# Patient Record
Sex: MALE | Race: WHITE | HISPANIC OR LATINO | Employment: UNEMPLOYED | URBAN - METROPOLITAN AREA
[De-identification: names, ages, dates, MRNs, and addresses within clinical notes are randomized per-mention and may not be internally consistent; named-entity substitution may affect disease eponyms.]

---

## 2018-03-27 ENCOUNTER — OFFICE VISIT - HEALTHEAST (OUTPATIENT)
Dept: FAMILY MEDICINE | Facility: CLINIC | Age: 18
End: 2018-03-27

## 2018-03-27 ENCOUNTER — COMMUNICATION - HEALTHEAST (OUTPATIENT)
Dept: FAMILY MEDICINE | Facility: CLINIC | Age: 18
End: 2018-03-27

## 2018-03-27 DIAGNOSIS — D49.2 ATYPICAL SQUAMOPROLIFERATIVE SKIN LESION: ICD-10-CM

## 2018-03-27 DIAGNOSIS — R53.83 FATIGUE: ICD-10-CM

## 2018-03-27 DIAGNOSIS — B27.90 INFECTIOUS MONONUCLEOSIS: ICD-10-CM

## 2018-03-27 DIAGNOSIS — L70.0 ACNE VULGARIS: ICD-10-CM

## 2018-03-27 LAB
BASOPHILS # BLD AUTO: 0 THOU/UL (ref 0–0.2)
BASOPHILS NFR BLD AUTO: 1 % (ref 0–2)
EOSINOPHIL # BLD AUTO: 0.4 THOU/UL (ref 0–0.4)
EOSINOPHIL NFR BLD AUTO: 4 % (ref 0–6)
ERYTHROCYTE [DISTWIDTH] IN BLOOD BY AUTOMATED COUNT: 12.2 % (ref 11–14.5)
HCT VFR BLD AUTO: 52.2 % (ref 40–54)
HGB BLD-MCNC: 17.7 G/DL (ref 14–18)
LYMPHOCYTES # BLD AUTO: 2.1 THOU/UL (ref 0.8–4.4)
LYMPHOCYTES NFR BLD AUTO: 25 % (ref 20–40)
MCH RBC QN AUTO: 31.1 PG (ref 27–34)
MCHC RBC AUTO-ENTMCNC: 33.9 G/DL (ref 32–36)
MCV RBC AUTO: 92 FL (ref 80–100)
MONOCYTES # BLD AUTO: 0.5 THOU/UL (ref 0–0.9)
MONOCYTES NFR BLD AUTO: 7 % (ref 2–10)
NEUTROPHILS # BLD AUTO: 5.2 THOU/UL (ref 2–7.7)
NEUTROPHILS NFR BLD AUTO: 63 % (ref 50–70)
PLATELET # BLD AUTO: 188 THOU/UL (ref 140–440)
PMV BLD AUTO: 8.6 FL (ref 7–10)
RBC # BLD AUTO: 5.7 MILL/UL (ref 4.4–6.2)
WBC: 8.2 THOU/UL (ref 4–11)

## 2018-04-19 ENCOUNTER — AMBULATORY - HEALTHEAST (OUTPATIENT)
Dept: FAMILY MEDICINE | Facility: CLINIC | Age: 18
End: 2018-04-19

## 2018-04-19 DIAGNOSIS — Q82.5 PORT WINE STAIN: ICD-10-CM

## 2018-04-19 DIAGNOSIS — Q82.5 CONGENITAL NEVUS: ICD-10-CM

## 2018-04-19 DIAGNOSIS — D49.2 ATYPICAL SQUAMOPROLIFERATIVE SKIN LESION: ICD-10-CM

## 2018-04-24 LAB
LAB AP CHARGES (HE HISTORICAL CONVERSION): NORMAL
PATH REPORT.COMMENTS IMP SPEC: NORMAL
PATH REPORT.FINAL DX SPEC: NORMAL
PATH REPORT.GROSS SPEC: NORMAL
PATH REPORT.MICROSCOPIC SPEC OTHER STN: NORMAL
PATH REPORT.RELEVANT HX SPEC: NORMAL
RESULT FLAG (HE HISTORICAL CONVERSION): NORMAL

## 2018-06-27 ENCOUNTER — OFFICE VISIT - HEALTHEAST (OUTPATIENT)
Dept: FAMILY MEDICINE | Facility: CLINIC | Age: 18
End: 2018-06-27

## 2018-06-27 DIAGNOSIS — S61.012A THUMB LACERATION, LEFT, INITIAL ENCOUNTER: ICD-10-CM

## 2018-08-06 ENCOUNTER — RECORDS - HEALTHEAST (OUTPATIENT)
Dept: ADMINISTRATIVE | Facility: OTHER | Age: 18
End: 2018-08-06

## 2018-08-30 ENCOUNTER — OFFICE VISIT - HEALTHEAST (OUTPATIENT)
Dept: FAMILY MEDICINE | Facility: CLINIC | Age: 18
End: 2018-08-30

## 2018-08-30 DIAGNOSIS — R06.02 SOB (SHORTNESS OF BREATH): ICD-10-CM

## 2018-08-30 DIAGNOSIS — F12.20 TETRAHYDROCANNABINOL (THC) USE DISORDER, MODERATE, DEPENDENCE (H): ICD-10-CM

## 2018-08-30 LAB
ALBUMIN SERPL-MCNC: 4.3 G/DL (ref 3.5–5)
ALP SERPL-CCNC: 72 U/L (ref 45–120)
ALT SERPL W P-5'-P-CCNC: 12 U/L (ref 0–45)
ANION GAP SERPL CALCULATED.3IONS-SCNC: 10 MMOL/L (ref 5–18)
AST SERPL W P-5'-P-CCNC: 11 U/L (ref 0–40)
BILIRUB SERPL-MCNC: 0.8 MG/DL (ref 0–1)
BUN SERPL-MCNC: 11 MG/DL (ref 8–22)
CALCIUM SERPL-MCNC: 10.1 MG/DL (ref 8.5–10.5)
CHLORIDE BLD-SCNC: 106 MMOL/L (ref 98–107)
CO2 SERPL-SCNC: 26 MMOL/L (ref 22–31)
CREAT SERPL-MCNC: 0.92 MG/DL (ref 0.7–1.3)
ERYTHROCYTE [DISTWIDTH] IN BLOOD BY AUTOMATED COUNT: 12.2 % (ref 11–14.5)
GFR SERPL CREATININE-BSD FRML MDRD: >60 ML/MIN/1.73M2
GLUCOSE BLD-MCNC: 84 MG/DL (ref 70–125)
HCT VFR BLD AUTO: 50.6 % (ref 40–54)
HGB BLD-MCNC: 17.4 G/DL (ref 14–18)
MCH RBC QN AUTO: 30.5 PG (ref 27–34)
MCHC RBC AUTO-ENTMCNC: 34.3 G/DL (ref 32–36)
MCV RBC AUTO: 89 FL (ref 80–100)
PLATELET # BLD AUTO: 207 THOU/UL (ref 140–440)
PMV BLD AUTO: 9.2 FL (ref 7–10)
POTASSIUM BLD-SCNC: 4.4 MMOL/L (ref 3.5–5)
PROT SERPL-MCNC: 7.2 G/DL (ref 6–8)
RBC # BLD AUTO: 5.69 MILL/UL (ref 4.4–6.2)
SODIUM SERPL-SCNC: 142 MMOL/L (ref 136–145)
WBC: 7.6 THOU/UL (ref 4–11)

## 2018-08-30 ASSESSMENT — MIFFLIN-ST. JEOR: SCORE: 1834.07

## 2018-08-31 ENCOUNTER — COMMUNICATION - HEALTHEAST (OUTPATIENT)
Dept: FAMILY MEDICINE | Facility: CLINIC | Age: 18
End: 2018-08-31

## 2018-10-10 ENCOUNTER — COMMUNICATION - HEALTHEAST (OUTPATIENT)
Dept: SCHEDULING | Facility: CLINIC | Age: 18
End: 2018-10-10

## 2020-01-21 ENCOUNTER — OFFICE VISIT - HEALTHEAST (OUTPATIENT)
Dept: FAMILY MEDICINE | Facility: CLINIC | Age: 20
End: 2020-01-21

## 2020-01-21 ENCOUNTER — COMMUNICATION - HEALTHEAST (OUTPATIENT)
Dept: TELEHEALTH | Facility: CLINIC | Age: 20
End: 2020-01-21

## 2020-01-21 DIAGNOSIS — B07.8 COMMON WART: ICD-10-CM

## 2020-01-21 DIAGNOSIS — Z23 NEED FOR VACCINATION: ICD-10-CM

## 2020-01-21 DIAGNOSIS — L60.0 INGROWN NAIL OF GREAT TOE OF LEFT FOOT: ICD-10-CM

## 2020-09-12 ENCOUNTER — RECORDS - HEALTHEAST (OUTPATIENT)
Dept: ADMINISTRATIVE | Facility: OTHER | Age: 20
End: 2020-09-12

## 2020-11-16 ENCOUNTER — COMMUNICATION - HEALTHEAST (OUTPATIENT)
Dept: FAMILY MEDICINE | Facility: CLINIC | Age: 20
End: 2020-11-16

## 2020-11-16 ENCOUNTER — COMMUNICATION - HEALTHEAST (OUTPATIENT)
Dept: SCHEDULING | Facility: CLINIC | Age: 20
End: 2020-11-16

## 2021-01-12 ENCOUNTER — OFFICE VISIT - HEALTHEAST (OUTPATIENT)
Dept: FAMILY MEDICINE | Facility: CLINIC | Age: 21
End: 2021-01-12

## 2021-01-12 DIAGNOSIS — L08.9 TOE INFECTION: ICD-10-CM

## 2021-01-12 DIAGNOSIS — L60.0 INGROWING NAIL, LEFT GREAT TOE: ICD-10-CM

## 2021-01-12 DIAGNOSIS — F41.0 ANXIETY ATTACK: ICD-10-CM

## 2021-01-12 DIAGNOSIS — R10.13 DYSPEPSIA: ICD-10-CM

## 2021-01-12 DIAGNOSIS — B07.0 PLANTAR WARTS: ICD-10-CM

## 2021-02-04 ENCOUNTER — AMBULATORY - HEALTHEAST (OUTPATIENT)
Dept: FAMILY MEDICINE | Facility: CLINIC | Age: 21
End: 2021-02-04

## 2021-02-04 DIAGNOSIS — F41.0 ANXIETY ATTACK: ICD-10-CM

## 2021-02-04 DIAGNOSIS — M25.512 CHRONIC LEFT SHOULDER PAIN: ICD-10-CM

## 2021-02-04 DIAGNOSIS — R10.13 DYSPEPSIA: ICD-10-CM

## 2021-02-04 DIAGNOSIS — G89.29 CHRONIC LEFT SHOULDER PAIN: ICD-10-CM

## 2021-02-04 DIAGNOSIS — B07.0 PLANTAR WARTS: ICD-10-CM

## 2021-02-04 DIAGNOSIS — L60.0 INGROWING NAIL, LEFT GREAT TOE: ICD-10-CM

## 2021-04-16 ENCOUNTER — RECORDS - HEALTHEAST (OUTPATIENT)
Dept: ADMINISTRATIVE | Facility: OTHER | Age: 21
End: 2021-04-16

## 2021-05-30 ENCOUNTER — RECORDS - HEALTHEAST (OUTPATIENT)
Dept: ADMINISTRATIVE | Facility: CLINIC | Age: 21
End: 2021-05-30

## 2021-06-01 VITALS — BODY MASS INDEX: 26.45 KG/M2 | WEIGHT: 187 LBS

## 2021-06-01 VITALS — BODY MASS INDEX: 25.06 KG/M2 | HEIGHT: 71 IN | WEIGHT: 179 LBS

## 2021-06-01 VITALS — BODY MASS INDEX: 26.25 KG/M2 | WEIGHT: 185.6 LBS

## 2021-06-01 VITALS — WEIGHT: 187 LBS | BODY MASS INDEX: 26.45 KG/M2

## 2021-06-01 VITALS — WEIGHT: 180 LBS | BODY MASS INDEX: 25.46 KG/M2

## 2021-06-01 VITALS — HEIGHT: 71 IN | BODY MASS INDEX: 25.1 KG/M2

## 2021-06-04 VITALS
OXYGEN SATURATION: 99 % | BODY MASS INDEX: 30.75 KG/M2 | HEART RATE: 53 BPM | DIASTOLIC BLOOD PRESSURE: 60 MMHG | WEIGHT: 220.5 LBS | SYSTOLIC BLOOD PRESSURE: 130 MMHG | TEMPERATURE: 98.1 F

## 2021-06-05 VITALS
TEMPERATURE: 97 F | HEART RATE: 56 BPM | BODY MASS INDEX: 25.52 KG/M2 | WEIGHT: 183 LBS | DIASTOLIC BLOOD PRESSURE: 60 MMHG | SYSTOLIC BLOOD PRESSURE: 100 MMHG | OXYGEN SATURATION: 99 %

## 2021-06-05 VITALS
DIASTOLIC BLOOD PRESSURE: 60 MMHG | WEIGHT: 177 LBS | HEART RATE: 73 BPM | SYSTOLIC BLOOD PRESSURE: 100 MMHG | BODY MASS INDEX: 24.69 KG/M2 | TEMPERATURE: 97.5 F | OXYGEN SATURATION: 98 %

## 2021-06-05 NOTE — PROGRESS NOTES
Assessment/Plan:    1. Ingrown nail of great toe of left foot  Ingrown toenail left great toe.  No sign of infection.  I recommend warm soaks with Epson salts multiple times a day.  We discussed nail hygiene and trimming nails straight across rather than curved.  We discussed signs and symptoms of infection to monitor for including increased redness, swelling, warmth or drainage.  Patient should follow-up if he develops any of the symptoms.    2. Common wart  Patient has 3 common warts on bilateral knees and left hand.  Cryotherapy performed today.  Recommend follow-up in roughly 3 weeks for second round of cryotherapy.    3. Need for vaccination  - Influenza, Seasonal Quad, PF =/> 6months      Subjective:    Carl Mccurdy is a 19 year old male seen today for evaluation of an ingrown toenail.  He is accompanied by his significant other today.  Patient developed symptoms of ingrown toenail about a week ago.  He denies any injury to his nail.  He noticed that it started bothering him randomly.  The nail appears ingrown and is irritated around the lateral corner.  He has been soaking it in warm water for the past couple of days.  He has not been using any medication or ointment on it.  He denies history of ingrown toenails.  No worsening redness, swelling or drainage.    Additionally, patient has 3 warts that he would like evaluated.  He has one on each knee and another on his left hand.  States that he has had these warts for a very long time.  Has not tried any over-the-counter medication.  He is interested in having these frozen off today if possible.  He otherwise feels well today does not any additional concerns.  Review of systems is as stated in HPI, and the remainder of the 10 system review is otherwise unremarkable.    Past Medical History, Family History, and Social History reviewed.    Past Surgical History:   Procedure Laterality Date     APPENDECTOMY          History reviewed. No pertinent family  history.     History reviewed. No pertinent past medical history.     Social History     Tobacco Use     Smoking status: Former Smoker     Types: Cigarettes     Smokeless tobacco: Never Used   Substance Use Topics     Alcohol use: Not on file     Drug use: Yes     Frequency: 1.0 times per week     Types: Marijuana        Current Outpatient Medications   Medication Sig Dispense Refill     ondansetron (ZOFRAN) 4 MG tablet Take 1 tablet (4 mg total) by mouth every 6 (six) hours. 12 tablet 0     ranitidine (ZANTAC) 75 MG tablet Take 75 mg by mouth 2 (two) times a day.       No current facility-administered medications for this visit.           Objective:    Vitals:    01/21/20 1419   BP: 130/60   Patient Site: Left Arm   Patient Position: Sitting   Cuff Size: Adult Large   Pulse: (!) 53   Temp: 98.1  F (36.7  C)   SpO2: 99%   Weight: 220 lb 8 oz (100 kg)      Body mass index is 30.75 kg/m .      General Appearance:  Alert, cooperative, no distress, appears stated age   Heart:  Regular rate and rhythm, S1, S2 normal, no murmur, rub or gallop   Extremities:  Patient's left great toenail appears ingrown on the lateral aspect.  Some irritation and tenderness on palpation.  No surrounding erythema, edema, warmth or drainage noted.  All other extremities normal.    Skin: Warm, dry.  Skin color, texture, turgor normal.  Patient has 3 common warts noted, one on each knee and one on the left hand.         This note has been dictated using voice recognition software. Any grammatical or context distortions are unintentional and inherent to the use of this software.

## 2021-06-14 NOTE — PROGRESS NOTES
Assessment/Plan:    1. Ingrowing nail, left great toe  Left great toe ingrown nail lateral aspect with granulation tissue present.  As below treated with cephalexin 500 mg 4 times daily x10 days initially and will schedule for partial nail avulsion procedure in 3 weeks.  Utilize padding between great toe and second toe to avoid pressure over this area.    2. Toe infection  Cephalexin 500 mg 4 times daily x10 days provided for left great toe infection associated with ingrown nail.  - cephalexin (KEFLEX) 500 MG capsule; Take 1 capsule (500 mg total) by mouth 4 (four) times a day for 10 days.  Dispense: 40 capsule; Refill: 0    3. Plantar warts  Plantars wart multiple left foot treated with cryotherapy freeze thaw freeze cycle.    4. Anxiety attack  Anxiety attacks described.  Initiate sertraline 50 mg once daily and reassess at follow-up in 3 weeks.  - sertraline (ZOLOFT) 50 MG tablet; Take 1 tablet (50 mg total) by mouth daily.  Dispense: 30 tablet; Refill: 2    5. Dyspepsia  Dyspepsia noted.  OTC omeprazole 20 mg daily x2 weeks or famotidine 20 mg twice daily as needed.      Subjective:    Calr Mccurdy is seen today for ingrown toenail left great toe.  No recent trauma.  Ongoing issues.  Some drainage.  No fever.  Multiple plantars warts also that he would like to have treated today.  Describes anxiety and anxiety attacks often worse around his family.  May awaken with anxiety attack however.  Also some indigestion issues.  Wondering about different products for this.  Denies fever.  No significant depression.  Comprehensive review of systems as above otherwise all negative.    Arelis Saab   Mom - [place]   Biologic dad not involved   1 stepbro -   Mom smokes   Surgeries: appy about age 5   Hospitalizations: strep infection when very young in knee requiring hospitalization x 1 week   Graduated from [employer] 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 No pertinent family history.     History reviewed. No pertinent past medical history.     Social History     Tobacco Use     Smoking status: Former Smoker     Types: Cigarettes     Smokeless tobacco: Current User     Tobacco comment: e-cigarette use   Substance Use Topics     Alcohol use: Not on file     Drug use: Yes     Frequency: 1.0 times per week     Types: Marijuana        Current Outpatient Medications   Medication Sig Dispense Refill     cephalexin (KEFLEX) 500 MG capsule Take 1 capsule (500 mg total) by mouth 4 (four) times a day for 10 days. 40 capsule 0     sertraline (ZOLOFT) 50 MG tablet Take 1 tablet (50 mg total) by mouth daily. 30 tablet 2     No current facility-administered medications for this visit.           Objective:    Vitals:    01/12/21 0925   BP: 100/60   Pulse: (!) 56   Temp: 97  F (36.1  C)   SpO2: 99%   Weight: 183 lb (83 kg)      Body mass index is 25.52 kg/m .    Alert.  No apparent distress.  Left foot ingrown nail lateral aspect with granulation tissue and scant purulent drainage.  Plantars warts multiple near third MTP joint plantar aspect as well as medial aspect of proximal heel.  Cryotherapy freeze thaw freeze cycle performed and tolerated well.      This note has been dictated using voice recognition software and as a result may contain minor grammatical errors and unintended word substitutions.

## 2021-06-15 NOTE — PROGRESS NOTES
Nail removal    Date/Time: 2/4/2021 1:09 PM  Performed by: Christopher Drake MD  Authorized by: Christopher Drake MD       Universal Protocol    Site marked: Yes    Prior images obtained and reviewed: No    Required items: required blood products, implants, devices, and special equipment available    Patient identity confirmed: verbally with patient    Reevaluation: Patient was reevaluated immediately before administering moderate or deep sedation or anesthesia    Confirmation checklist: patient's identity using two indicators    Time out: Immediately prior to procedure a time out was called to verify the correct patient, procedure, equipment, support staff and site/side marked as required    Universal Protocol: Joint Commission Universal Protocol was followed    Preparation: Patient was prepped and draped in the usual sterile fashion    ESBL (mL): 1    Location  Location: left foot      Sedation  Patient sedation: No    Procedure Details  Preparation: skin prepped with Betadine  Amount removed: 1/3  Side: lateral  Wedge excision of skin of nail fold: no  Nail bed sutured: no  Nail matrix removed: none  Removed nail replaced and anchored: no  Dressing: antibiotic ointment, gauze roll and dressing applied      Post-procedure   Length of time physician present for 1:1 monitoring during sedation: 0

## 2021-06-15 NOTE — PROGRESS NOTES
Assessment/Plan:    1. Ingrowing nail, left great toe  Left great toe ingrowing nail lateral aspect.  Please see procedure note for partial nail avulsion procedure tolerated well.  Wound cares reviewed.  Anticipate reassessment at scheduled follow-up in 3 to 4 weeks.    2. Anxiety attack  Anxiety with improved control after initiating sertraline 50 mg daily January 12, 2021.  Reassess at follow-up as scheduled in 3 to 4 weeks.    3. Dyspepsia  Dyspepsia improved with omeprazole 20 mg daily over past couple weeks.  Patient will now utilize on as-needed basis.    4. Plantar warts  Powering of significant warts involving third MTP joint plantar aspect as well as medial aspect of left heel completed followed by cryotherapy freeze thaw freeze cycle tolerated well.  Anticipate third treatment at follow-up in 3 to 4 weeks.    5. Chronic left shoulder pain  Chronic left shoulder pain with described popping sensation question subluxation concerns without history of dislocation.  X-ray not currently available and will complete at follow-up visit in 3 or 4 weeks.        Subjective:    Carl Mccurdy is seen today for several concerns.  Ingrowing nail left great toe.  Improvement with cephalexin however ongoing tenderness and swelling and patient would like to complete partial nail avulsion procedure as scheduled today.  Also had reviewed anxiety concerns at prior visit January 12, 2021 and started on sertraline 50 mg daily which has helped significantly reduce worry and mind racing activities etc.  Patient also utilizing OTC omeprazole 20 mg daily over past couple weeks with resolved dyspeptic symptoms.  Left shoulder does seem to pop frequently and has not had shoulder dislocation issues historically.  Pain associated with shoulder activity at times.  Comprehensive review of systems as above otherwise all negative.    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     No past medical history on file.     Social History     Tobacco Use     Smoking status: Former Smoker     Types: Cigarettes     Smokeless tobacco: Current User     Tobacco comment: e-cigarette use   Substance Use Topics     Alcohol use: Not on file     Drug use: Yes     Frequency: 1.0 times per week     Types: Marijuana        Current Outpatient Medications   Medication Sig Dispense Refill     sertraline (ZOLOFT) 50 MG tablet Take 1 tablet (50 mg total) by mouth daily. 30 tablet 2     No current facility-administered medications for this visit.           Objective:    Vitals:    02/04/21 1223   BP: 100/60   Pulse: 73   Temp: 97.5  F (36.4  C)   SpO2: 98%   Weight: 177 lb (80.3 kg)      Body mass index is 24.69 kg/m .    Alert.  Cooperative.  Mild psychomotor agitation only.  Left foot plantars warts noted involving medial aspect of left heel as well as significant area involving third MTP region.  Cryotherapy following partnering procedure with freeze thaw freeze cycle tolerated well and anticipate repeat at follow-up visit in 3 to 4 weeks.  Please see procedure note for partial nail avulsion procedure involving left great toe with ingrown nail history and surrounding inflammation granulation tissue present.      This note has been dictated using voice recognition software and as a result may contain minor grammatical errors and unintended word substitutions.

## 2021-06-16 PROBLEM — F12.20 TETRAHYDROCANNABINOL (THC) USE DISORDER, MODERATE, DEPENDENCE (H): Status: ACTIVE | Noted: 2018-08-30

## 2021-06-16 NOTE — PROGRESS NOTES
"Assessment/Plan:    1. Infectious mononucleosis  Recent diagnosis with infectious mononucleosis March 6, 2018.  Has been able to return to work however still has fatigue issues.  Patient feels 90% better.  Denies abdominal pain.  No nausea or vomiting.  No diarrhea.  No abdominal distention.  Not involved in contact sports etc.  Needs a note stating that he was seen today.  This was provided.  - HM1(CBC and Differential)  - HM1 (CBC with Diff)    2. Fatigue  Due to persistent concerns with fatigue to check CBC with differential with recent infectious mononucleosis diagnosed.  Will notify with results.  Anticipate ongoing improvement over next 2-3 weeks.  - HM1(CBC and Differential)  - HM1 (CBC with Diff)    3. Atypical squamoproliferative skin lesion  Due to persistent concerns with raised squamous proliferative skin lesion on dorsum of left hand on ulnar surface will schedule for definitive biopsy per patient request.  Prior cryotherapy without benefit.    4. Acne vulgaris  History of acne vulgaris.  No longer on doxycycline or Differin gel.  Some residual facial scarring noted from cystic acne otherwise declines further intervention at this time.          Subjective:    Carl Mccurdy is seen today for recent diagnosis with infectious mononucleosis.  [facility] March 6, 2018 with diagnosis made.  Feels 90% better however persistent fatigue and would be interested in having white blood count checked.  No nausea vomiting.  No abdominal distention.  No abdominal discomfort.  No diarrhea.  No other skin rash.  Does have skin lesion however on left hand dorsal aspect more ulnar surface that he would like to have removed.  Prior cryotherapy of \"warts\" without benefit.  Has not had this done recently.  Does have history of prior acne vulgaris.  Had been on doxycycline in the past as well as Differin gel however no longer requiring.  Scarring residual to cystic acne however no " significant inflammatory lesions currently.    Stepdad - [employer]   Mom - [place]   Biologic dad not involved   1 stepbro - Mom smokes   Surgeries: appy about age 5   Hospitalizations: strep infection when very young in knee requiring hospitalization x 1 week   11th grade at [employer] (fall, 2015)     Past Surgical History:   Procedure Laterality Date     APPENDECTOMY          History reviewed. No pertinent family history.     History reviewed. No pertinent past medical history.     Social History   Substance Use Topics     Smoking status: Current Some Day Smoker     Types: Cigarettes     Smokeless tobacco: Never Used     Alcohol use None        No current outpatient prescriptions on file.     No current facility-administered medications for this visit.           Objective:    Vitals:    03/27/18 0824   BP: 100/60   Pulse: 64   Weight: 187 lb (84.8 kg)      Body mass index is 26.45 kg/(m^2).    Alert.  No apparent distress.  Facial scarring from cystic acne noted otherwise no significant inflammatory lesions at this time.  Left hand with 7 mm hyperkeratotic lesion raised dorsal aspect near ulnar surface.  Chest clear.  Cardiac exam regular.  Abdomen without spleen enlargement or other organomegaly.  No guarding or rebound.  Nondistended.  Positive bowel sounds.      This note has been dictated using voice recognition software. Any grammatical or context distortions are unintentional and inherent to the use of the software.

## 2021-06-17 NOTE — PATIENT INSTRUCTIONS - HE
Patient Instructions by Sharon Sterling CNP at 1/21/2020  2:20 PM     Author: Sharon Sterling CNP Service: -- Author Type: Nurse Practitioner    Filed: 1/21/2020  2:49 PM Encounter Date: 1/21/2020 Status: Signed    : Sharon Sterling CNP (Nurse Practitioner)       Patient Education     Ingrown Toenail, Not Infected (Home Treatment)  An ingrown toenail occurs when the nail grows sideways into the skin next to the nail. This can cause pain, especially when wearing tight shoes. It can also lead to an infection with redness, swelling, and pus drainage. Most people respond to the treatments described here. But sometimes surgery is needed. The big toe is most often affected.   The most common cause of an ingrown toenail is trimming your nails wrong. Most people trim the nails too close to the skin and try to round the nail too tightly around the shape of the toe. When you do this, the nail can grow into the skin of your toe. It is safer to trim the nail ending in a straight line rather than a curve.  Home care  The following guidelines will help you care for your toenail at home:    Soak the painful toe in warm water 3 to 4 times each day, for 10 to 20 minutes each time. Adding Epsom salt may be recommended by your healthcare provider. Wash the entire foot with an antibacterial soap. Then keep it dry.    If there is redness or swelling around the toenail, apply an antibiotic ointment 3 times a day.    Insert a small piece of rolled-up cotton under the corner of the nail. This helps the nail to grow outward, away from the cuticle.    Wear shoes that dont put pressure on the toes, such as a sandal or open shoe. Closed shoes should be big enough in the toes so that there is no pressure on the painful toe.    You may use acetaminophen or ibuprofen for pain, unless another pain medicine was prescribed. Talk with your healthcare provider before using these medicines if you have chronic liver or kidney disease. Also tell  your provider if you have ever had a stomach ulcer or GI (gastrointestinal) bleeding.  Prevention  The following tips will help you prevent ingrown toenails:    Avoid pointed, tight, or narrow shoes.    Trim toenails once a month so they dont grow too long. Cut the nail straight across.  Follow-up care  Follow up with your healthcare provider, or as advised.  When to seek medical advice  Call your healthcare provider right away if any of these occur:    Increasing redness, pain, or swelling of the toe    Tender red streaks in the skin leading toward the ankle    Pus or fluid drainage from the toe    Fever of 100.4 F (38 C) or higher, or as directed by your provider  Date Last Reviewed: 4/1/2017 2000-2017 [employer]. [address] [place] [address], [place] [address]. All rights reserved. This information is not intended as a substitute for professional medical care. Always follow your healthcare professional's instructions.

## 2021-06-17 NOTE — PROGRESS NOTES
"Left hand skin biopsy (8 mm punch)  Date/Time: 4/19/2018 4:34 PM  Performed by: LOU LINDSAY  Authorized by: LOU LINDSAY   Consent: Verbal consent obtained.  Consent given by: patient  Patient understanding: patient states understanding of the procedure being performed  Patient identity confirmed: verbally with patient  Time out: Immediately prior to procedure a \"time out\" was called to verify the correct patient, procedure, equipment, support staff and site/side marked as required.  Preparation: Patient was prepped and draped in the usual sterile fashion.  Local anesthesia used: yes    Anesthesia:  Local anesthesia used: yes    Sedation:  Patient sedated: no  Patient tolerance: Patient tolerated the procedure well with no immediate complications  Comments: 8 mm punch biopsy performed on left dorsal lateral aspect of proximal hand for 7 x 8 mm raised skin lesion with verrucous appearance.  Specimen sent for pathology.  Reapproximation with 4-0 Ethilon suture in simple interrupted fashion ×4.  Good hemostasis achieved.  Triple antibiotic ointment and Band-Aid applied following.          "

## 2021-06-17 NOTE — PROGRESS NOTES
Assessment/Plan:    1. Atypical squamoproliferative skin lesion  Please see procedure note for definitive excisional biopsy left hand skin lesion.  Wound care is reviewed.  Suture removal 1 week.  - Skin / nail biopsy            Subjective:    Carl Mccurdy is seen today for persistent concerns with left hand skin lesion.  Felt initially to be a possible wart.  Has had cryotherapy in the past which has made it smaller however has returned and has been present for several years in fact.  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  No drainage from area of left hand.  No significant pain associated with this.  No ulceration.    Stepdad - [employer]   Mom - [place]   Biologic dad not involved   1 stepbro -   Mom smokes   Surgeries: appy about age 5   Hospitalizations: strep infection when very young in knee requiring hospitalization x 1 week   Graduated from [employer] (spring, 2017)  Plan to return for college  Work: [employer] in the [employer]    Past Surgical History:   Procedure Laterality Date     APPENDECTOMY          No family history on file.     No past medical history on file.     Social History   Substance Use Topics     Smoking status: Current Some Day Smoker     Types: Cigarettes     Smokeless tobacco: Never Used     Alcohol use None        No current outpatient prescriptions on file.     No current facility-administered medications for this visit.           Objective:    Vitals:    04/19/18 1536   BP: 100/60   Pulse: 64   Weight: 187 lb (84.8 kg)      Body mass index is 26.45 kg/(m^2).    Alert.  Mildly anxious.  Left hand with 7 x 8 mm hyperkeratotic lesion raised appears verrucous in nature.  Also fleshy nevus left temporal region.  Port wine stain right hand.      This note has been dictated using voice recognition software and as a result may contain minor grammatical errors and unintended word substitutions.

## 2021-06-20 NOTE — PROGRESS NOTES
Assessment/Plan:    1. SOB (shortness of breath)  Subjective shortness of breath.  No hypoxia.  Chest x-ray negative.  Spirometry with mild restriction without obstruction.  No history of asthma.  Referred to allergy for further evaluation.  Cessation of marijuana use reviewed.  - XR Chest 2 Views  - Spirometry without bronchodilator  - Ambulatory referral to Allergy    2. THC use disorder, moderate, dependence (H)  Cessation of marijuana use reviewed.        Subjective:    Carl Mccurdy is seen today for shortness of breath.  Feels associated with marijuana use.  Smokes between 3-4 joints in the evening before bed has cut back to 1 or 2 recently.  Feels winded with certain activities.  Denies heartburn.  No fevers or chills.  No cough.  No chest pain.  Does recognize dependence on marijuana use at this time however declines further assessment or treatment options.  Works at [employer] in the [facility].  Not in school any longer.  Uncertain about allergies, environmental.  Not getting consistent exercise.  Comprehensive review of systems as above otherwise all negative.    Stepdad - [employer]   Mom - [employer]   Biologic dad not involved   1 stepbro -   Mom smokes   Surgeries: appy about age 5   Hospitalizations: strep infection when very young in knee requiring hospitalization x 1 week   Graduated from [employer] (spring, 2017)  Plan to return for college  Work: [employer] in the [facility]    Past Surgical History:   Procedure Laterality Date     APPENDECTOMY          History reviewed. No pertinent family history.     History reviewed. No pertinent past medical history.     Social History   Substance Use Topics     Smoking status: Former Smoker     Types: Cigarettes     Smokeless tobacco: Never Used     Alcohol use None        Current Outpatient Prescriptions   Medication Sig Dispense Refill     ranitidine (ZANTAC) 75 MG tablet Take 75 mg by mouth 2 (two) times a day.       No current facility-administered medications for  "this visit.           Objective:    Vitals:    08/30/18 1438   BP: 120/60   Pulse: 77   Temp: 98  F (36.7  C)   SpO2: 98%   Weight: 179 lb (81.2 kg)   Height: 5' 11\" (1.803 m)      Body mass index is 24.97 kg/(m^2).    Alert.  No apparent distress.  Quiet affect.  Chest clear.  Cardiac exam regular.  HEENT exam without significant nasal congestion.  Oropharynx normal with moist mucous membranes.  Extremities warm and dry.  No rash.      This note has been dictated using voice recognition software and as a result may contain minor grammatical errors and unintended word substitutions.   "

## 2021-06-26 ENCOUNTER — HEALTH MAINTENANCE LETTER (OUTPATIENT)
Age: 21
End: 2021-06-26

## 2021-07-03 NOTE — ADDENDUM NOTE
Addendum Note by Lou Lindsay MD at 4/20/2018  9:45 AM     Author: Lou Lindsay MD Service: -- Author Type: Physician    Filed: 4/20/2018  9:45 AM Encounter Date: 4/19/2018 Status: Signed    : Lou Lindsay MD (Physician)    Addended by: LOU LINDSAY on: 4/20/2018 09:45 AM        Modules accepted: Orders

## 2021-08-01 ENCOUNTER — OFFICE VISIT (OUTPATIENT)
Dept: FAMILY MEDICINE | Facility: CLINIC | Age: 21
End: 2021-08-01
Payer: COMMERCIAL

## 2021-08-01 VITALS
DIASTOLIC BLOOD PRESSURE: 59 MMHG | RESPIRATION RATE: 18 BRPM | TEMPERATURE: 98.5 F | OXYGEN SATURATION: 98 % | SYSTOLIC BLOOD PRESSURE: 104 MMHG | HEART RATE: 59 BPM

## 2021-08-01 DIAGNOSIS — R07.0 THROAT PAIN: Primary | ICD-10-CM

## 2021-08-01 DIAGNOSIS — J03.90 TONSILLITIS: ICD-10-CM

## 2021-08-01 LAB — DEPRECATED S PYO AG THROAT QL EIA: NEGATIVE

## 2021-08-01 PROCEDURE — U0003 INFECTIOUS AGENT DETECTION BY NUCLEIC ACID (DNA OR RNA); SEVERE ACUTE RESPIRATORY SYNDROME CORONAVIRUS 2 (SARS-COV-2) (CORONAVIRUS DISEASE [COVID-19]), AMPLIFIED PROBE TECHNIQUE, MAKING USE OF HIGH THROUGHPUT TECHNOLOGIES AS DESCRIBED BY CMS-2020-01-R: HCPCS | Performed by: PHYSICIAN ASSISTANT

## 2021-08-01 PROCEDURE — U0005 INFEC AGEN DETEC AMPLI PROBE: HCPCS | Performed by: PHYSICIAN ASSISTANT

## 2021-08-01 PROCEDURE — 87651 STREP A DNA AMP PROBE: CPT | Performed by: PHYSICIAN ASSISTANT

## 2021-08-01 PROCEDURE — 99213 OFFICE O/P EST LOW 20 MIN: CPT | Performed by: PHYSICIAN ASSISTANT

## 2021-08-01 RX ORDER — FLUTICASONE PROPIONATE 50 MCG
1 SPRAY, SUSPENSION (ML) NASAL DAILY
Qty: 9.9 ML | Refills: 0 | Status: SHIPPED | OUTPATIENT
Start: 2021-08-01 | End: 2022-02-17

## 2021-08-01 RX ORDER — PREDNISONE 20 MG/1
40 TABLET ORAL DAILY
Qty: 8 TABLET | Refills: 0 | Status: SHIPPED | OUTPATIENT
Start: 2021-08-01 | End: 2021-08-05

## 2021-08-01 ASSESSMENT — ENCOUNTER SYMPTOMS
WHEEZING: 0
GASTROINTESTINAL NEGATIVE: 1
VOMITING: 0
HEADACHES: 1
NAUSEA: 0
CHILLS: 0
FEVER: 0
SORE THROAT: 1
COUGH: 1
SINUS PRESSURE: 1

## 2021-08-01 NOTE — LETTER
August 10, 2021      Carl Boykinpatriciarobyn  [address] [place] [address]          SARS CoV2 PCR (no units)   Date Value   08/01/2021 Positive (A)       This letter provides a written record that you were tested for COVID-19. Your result was positive. This means you have COVID-19 (coronavirus).    How can I protect others?If you have symptoms, stay home and away from others (self-isolate) until:You ve had no fever--and no medicine that reduces fever--for 1 full day (24 hours). And      Your other symptoms have gotten better. For example, your cough or breathing has improved. And     At least 10 days have passed since your symptoms started. (If you've been told by a doctor that you have a weak immune system, wait 20 days).    If you don t have symptoms: Stay home and away from others (self-isolate) until at least 10 days have passed since your first positive COVID-19 test. If you have a weak immune system, please self-isolate for 20 days.    During this time:    Stay in your own room, including for meals. Use your own bathroom if you can.    Stay away from others in your home. No hugging, kissing or shaking hands. No visitors.     Don t go to work, school or anywhere else.     Clean  high touch  surfaces often (doorknobs, counters, handles, etc.). Use a household cleaning spray or wipes. You ll find a full list on the EPA website at www.epa.gov/pesticide-registration/list-n-disinfectants-use-against-sars-cov-2.     Cover your mouth and nose with a mask or other face covering to avoid spreading germs.    Wash your hands and face often with soap and water.    Make a list of people you have been in close contact with recently, even if either of you wore a face covering.  o Start your list from 2 days before you became ill or had a positive test.  o Include anyone that was within 6 feet of you for a cumulative total of 15 minutes or more in 24 hours. (Example: if you sat next to Panfilo for 5 minutes in the morning  and 10 minutes in the afternoon, then you were in close contact for 15 minutes total that day. Panfilo would be added to your list.)        A public health worker will call or text you. It is important that you answer. They will ask you questions about possible exposures to COVID-19, such as people you have been in direct contact with and places you have visited.    Tell the people on your list that you have COVID-19; they should stay away from others for 14 days starting form the last time they were in contact with you (unless you are told something different from a public health worker).    Caregivers in these groups are at risk for severe illness due to COVID-19:  o People 65 years and older  o People who live in a nursing home or long-term care facility  o People with chronic disease (lung, heart, cancer, diabetes, kidney, liver, immunologic)  o People who have a weakened immune system, including those who:  - Are in cancer treatment  - Take medicine that weakens the immune system, such as corticosteroids  - Had a bone marrow or organ transplant  - Have an immune deficiency  - Have poorly controlled HIV or AIDS  - Are obese (body mass index of 40 or higher)  - Smoke regularly    Caregivers should wear gloves while washing dishes, handling laundry and cleaning bedrooms and bathrooms.    Wash and dry laundry with special caution. Don t shake dirty laundry, and use the warmest water setting you can.    If you have a weakened immune system, ask your doctor about other actions you should take.    For more tips, go to www.cdc.gov/coronavirus/2019-ncov/downloads/10Things.pdf.    You should not go back to work until you meet the guidelines above for ending your home isolation. You don't need to be retested for COVID-19 before going back to work- studies show that you won't spread the virus if it's been at least 10 days since your symptoms started (or 20 days, if you have a weak immune system).    Employers: This document  serves as formal notice of your employee s medical guidelines for going back to work. They must meet the above guidelines before going back to work in person.    How can I take care of myself?    1. Get lots of rest. Drink extra fluids (unless a doctor has told you not to).    2. Take Tylenol (acetaminophen) for fever or pain. If you have liver or kidney problems, ask your family doctor if it s okay to take Tylenol.     Take either:     650 mg (two 325 mg pills) every 4 to 6 hours, or     1,000 mg (two 500 mg pills) every 8 hours as needed.     Note: Don t take more than 3,000 mg in one day. Acetaminophen is found in many medicines (both prescribed and over-the-counter medicines). Read all labels to be sure you don t take too much.    For children, check the Tylenol bottle for the right dose (based on their age or weight).    3. If you have other health problems (like cancer, heart failure, an organ transplant or severe kidney disease): Call your specialty clinic if you don t feel better in the next 2 days.    4. Know when to call 911: Emergency warning signs include:    Trouble breathing or shortness of breath    Pain or pressure in the chest that doesn t go away    Feeling confused like you haven t felt before, or not being able to wake up    Bluish-colored lips or face    5. Sign up for [employer]. We know it s scary to hear that you have COVID-19. We want to track your symptoms to make sure you re okay over the next 2 weeks. Please look for an email from [employer]--this is a free, online program that we ll use to keep in touch. To sign up, follow the link in the email. Learn more at [email or web address].      Where can I get more information?     [facility] [place]: [email or web address]    Coronavirus Basics: www.[email or web address]/diseases/coronavirus/basics.html    What to Do If You re Sick: www.cdc.gov/coronavirus/2019-ncov/about/steps-when-sick.html    Ending Home Isolation:  www.cdc.gov/coronavirus/2019-ncov/hcp/disposition-in-home-patients.html     Caring for Someone with COVID-19: www.cdc.gov/coronavirus/2019-ncov/if-you-are-sick/care-for-someone.html     [facility] clinical trials (COVID-19 research studies): [email or web address].[email or web address]-clinical-trials

## 2021-08-01 NOTE — PROGRESS NOTES
"Patient presents with:  Headache: Comes and goes X 3 days  Pharyngitis: Hurts to eat X 3days, comes and goes      Clinical Decision Making: Discal exam is relatively benign with the exception of enlarged tonsils.  RST is negative, confirmatory and Covid test are pending.  Recommend supportive cares.  Patient prescribed prednisone for tonsillar swelling.  Patient was also prescribed Flonase for postnasal drainage and sinus congestion.      ICD-10-CM    1. Throat pain  R07.0 Streptococcus A Rapid Screen w/Reflex to PCR     Symptomatic COVID-19 Virus (Coronavirus) by PCR Nasopharyngeal     Group A Streptococcus PCR Throat Swab     fluticasone (FLONASE) 50 MCG/ACT nasal spray   2. Tonsillitis  J03.90 predniSONE (DELTASONE) 20 MG tablet       Patient Instructions   1) Increase fluids and rest  2) Continue taking Tylenol/Ibuprofen for pain relief as needed.  3) Salt water gargles and lozenges can be helpful for throat relief. Begin taking Prednisone according to bottle instructions.   4) Take Flonase at night before bed. You may continue to take the over the counter allergy med. Try to avoid being outside when the air quality is \"unhealthy\"  5) You will only be notified of the confirmatory strep and/or COVID results if they are positive.        HPI:  Carl Mccurdy is a 20 year old male who presents today complaining of intermittent throat pain and headache for the past 3 days.  He has not had any fevers, chills, wheezing, visual disturbances, nausea, or vomiting.  His headache is mostly all over the place and sometimes some pressure in the sinuses.  He reports some postnasal drainage, nasal congestion.  He started taking over-the-counter allergy medicine, but is not sure what is called.  He is not vaccinated against Covid.    History obtained from the patient.    Problem List:  2018-08: Tetrahydrocannabinol (THC) use disorder, moderate,   dependence (H)      No past medical history on file.    Social History "     Tobacco Use     Smoking status: Former Smoker     Types: Cigarettes     Smokeless tobacco: Current User     Tobacco comment: e-cigarette use   Substance Use Topics     Alcohol use: Not on file       Review of Systems   Constitutional: Negative for chills and fever.   HENT: Positive for congestion, ear pain (intermittnet bilateral), nosebleeds (this morning after waking), postnasal drip, sinus pressure and sore throat.    Eyes: Negative for visual disturbance.   Respiratory: Positive for cough. Negative for wheezing.    Gastrointestinal: Negative.  Negative for nausea and vomiting.   Neurological: Positive for headaches.       Vitals:    08/01/21 1459   BP: 104/59   BP Location: Right arm   Patient Position: Sitting   Cuff Size: Adult Regular   Pulse: 59   Resp: 18   Temp: 98.5  F (36.9  C)   TempSrc: Oral   SpO2: 98%       Physical Exam  Vitals and nursing note reviewed.   Constitutional:       General: He is not in acute distress.     Appearance: He is not toxic-appearing or diaphoretic.   HENT:      Head: Normocephalic and atraumatic.      Right Ear: Tympanic membrane, ear canal and external ear normal.      Left Ear: Tympanic membrane, ear canal and external ear normal.      Mouth/Throat:      Mouth: Mucous membranes are moist.      Pharynx: Posterior oropharyngeal erythema present. No oropharyngeal exudate.      Comments: 2+ tonsils bilaterally  Eyes:      Conjunctiva/sclera: Conjunctivae normal.   Cardiovascular:      Rate and Rhythm: Normal rate and regular rhythm.      Heart sounds: No murmur heard.     Pulmonary:      Effort: Pulmonary effort is normal. No respiratory distress.      Breath sounds: No stridor. No wheezing, rhonchi or rales.   Neurological:      Mental Status: He is alert.   Psychiatric:         Mood and Affect: Mood normal.         Behavior: Behavior normal.         Thought Content: Thought content normal.         Judgment: Judgment normal.         Labs:  Results for orders placed or  performed in visit on 08/01/21   Streptococcus A Rapid Screen w/Reflex to PCR     Status: Normal    Specimen: Throat; Swab   Result Value Ref Range    Group A Strep antigen Negative Negative   Symptomatic COVID-19 Virus (Coronavirus) by PCR Nasopharyngeal     Status: None (In process)    Specimen: Nasopharyngeal; Swab    Narrative    The following orders were created for panel order Symptomatic COVID-19 Virus (Coronavirus) by PCR Nasopharyngeal.  Procedure                               Abnormality         Status                     ---------                               -----------         ------                     SARS-COV2 (COVID-19) Vir...[[id number]]                      In process                   Please view results for these tests on the individual orders.       At the end of the encounter, I discussed results, diagnosis, medications. Discussed red flags for immediate return to clinic/ER, as well as indications for follow up if no improvement. Patient understood and agreed to plan. Patient was stable for discharge.

## 2021-08-01 NOTE — PATIENT INSTRUCTIONS
"1) Increase fluids and rest  2) Continue taking Tylenol/Ibuprofen for pain relief as needed.  3) Salt water gargles and lozenges can be helpful for throat relief. Begin taking Prednisone according to bottle instructions.   4) Take Flonase at night before bed. You may continue to take the over the counter allergy med. Try to avoid being outside when the air quality is \"unhealthy\"  5) You will only be notified of the confirmatory strep and/or COVID results if they are positive.    "

## 2021-08-02 LAB
GROUP A STREP BY PCR: NOT DETECTED
SARS-COV-2 RNA RESP QL NAA+PROBE: POSITIVE

## 2021-08-10 ENCOUNTER — TELEPHONE (OUTPATIENT)
Dept: FAMILY MEDICINE | Facility: CLINIC | Age: 21
End: 2021-08-10

## 2021-08-10 NOTE — TELEPHONE ENCOUNTER
"-Coronavirus (COVID-19) Notification    Headaches/coughing pt stated that he experience when he went in to get tested. I mentioned to him and apologized for the late call about his positive covid result. (explained to him that there was a miscommunication relating to technical causing us to not receive his result to be notified accordingly). He mentioned that he had gotten something stating it was ok for him to return back to work on Aug 8th, hence why he went to work the following day on Monday 9th Aug. Pt currently was at work, he gave me permission to speak to his boss about this. I explained to his boss that pt should be quarantining until the 12th as that's his last day of isolation starting when he had sx. His boss understood, pt currently leaving workplace to continue isolation. Mentioned that if he meets criteria on last day for 24h, he can go back to work on the 13th Friday Aug. Pt understood this. Encouraged him to also reach out to anyone he has knowledge about being exposed to recently and letting them know. Pt said he will, and will touch base w/ his doctor also.    Caller Name (Patient, parent, daughter/son, grandparent, etc)  Pt    Reason for call  Notify of Positive Coronavirus (COVID-19) lab results, assess symptoms,  review [facility] recommendations    Lab Result    Lab test:  2019-nCoV rRt-PCR or SARS-CoV-2 PCR    Oropharyngeal AND/OR nasopharyngeal swabs is POSITIVE for 2019-nCoV RNA/SARS-COV-2 PCR (COVID-19 virus)    RN Recommendations/Instructions per [facility] Coronavirus COVID-19 recommendations    Brief introduction script  Introduce self then review script:  \"I am calling on behalf of [employer].  We were notified that your Coronavirus test (COVID-19) for was POSITIVE for the virus.  I have some information to relay to you but first I wanted to mention that the [place] Dept of Health will be contacting you shortly [it's possible [facility] already called Patient] to talk to you " "more about how you are feeling and other people you have had contact with who might now also have the virus.  Also, [facility] is Partnering with the [facility] for Covid-19 research, you may be contacted directly by research staff.\"    Assessment (Inquire about Patient's current symptoms)   Assessment   Current Symptoms at time of phone call: (if no symptoms, document No symptoms] No taste today   Symptoms onset (if applicable) 8/2     If at time of call, Patients symptoms hare worsened, the Patient should contact 911 or have someone drive them to Emergency Dept promptly:      If Patient calling 911, inform 911 personal that you have tested positive for the Coronavirus (COVID-19).  Place mask on and await 911 to arrive.    If Emergency Dept, If possible, please have another adult drive you to the Emergency Dept but you need to wear mask when in contact with other people.      Monoclonal Antibody Administration    You may be eligible to receive a new treatment with a monoclonal antibody for preventing hospitalization in patients at high risk for complications from COVID-19.   This medication is still experimental and available on a limited basis; it is given through an IV and must be given at an infusion center. Please note that not all people who are eligible will receive the medication since it is in limited supply.     Are you interested in being considered for this medication?  No.   Does the patient fit the criteria: No at time of phone call most of pt sx have subsided    If patient qualifies based on above criteria:  \"You will be contacted if you are selected to receive this treatment in the next 1-2 business days.   This is time sensitive and if you are not selected in the next 1-2 business days, you will not receive the medication.  If you do not receive a call to schedule, you have not been selected.\"      Review information with Patient    Your result was positive. This means you have COVID-19 " (coronavirus).  We have sent you a letter that reviews the information that I'll be reviewing with you now.    How can I protect others?    If you have symptoms: stay home and away from others (self-isolate) until:    You've had no fever--and no medicine that reduces fever--for 1 full day (24 hours). And       Your other symptoms have gotten better. For example, your cough or breathing has improved. And     At least 10 days have passed since your symptoms started. (If you've been told by a doctor that you have a weak immune system, wait 20 days.)     If you don't have symptoms: Stay home and away from others (self-isolate) until at least 10 days have passed since your first positive COVID-19 test. (Date test collected)    During this time:    Stay in your own room, including for meals. Use your own bathroom if you can.    Stay away from others in your home. No hugging, kissing or shaking hands. No visitors.     Don't go to work, school or anywhere else.     Clean  high touch  surfaces often (doorknobs, counters, handles, etc.). Use a household cleaning spray or wipes. You'll find a full list on the EPA website at www.epa.gov/pesticide-registration/list-n-disinfectants-use-against-sars-cov-2.     Cover your mouth and nose with a mask, tissue or other face covering to avoid spreading germs.    Wash your hands and face often with soap and water.    Make a list of people you have been in close contact with recently, even if either of you wore a face covering.   ; Start your list from 2 days before you became ill or had a positive test.  ; Include anyone that was within 6 feet of you for a cumulative total of 15 minutes or more in 24 hours. (Example: if you sat next to Panfilo for 5 minutes in the morning and 10 minutes in the afternoon, then you were in close contact for 15 minutes total that day. Panfilo would be added to your list.)    A public health worker will call or text you. It is important that you answer. They will  ask you questions about possible exposures to COVID-19, such as people you have been in direct contact with and places you have visited.    Tell the people on your list that you have COVID-19; they should stay away from others for 14 days starting from the last time they were in contact with you (unless you are told something different from a public health worker).     Caregivers in these groups are at risk for severe illness due to COVID-19:  o People 65 years and older  o People who live in a nursing home or long-term care facility  o People with chronic disease (lung, heart, cancer, diabetes, kidney, liver, immunologic)  o People who have a weakened immune system, including those who:  - Are in cancer treatment  - Take medicine that weakens the immune system, such as corticosteroids  - Had a bone marrow or organ transplant  - Have an immune deficiency  - Have poorly controlled HIV or AIDS  - Are obese (body mass index of 40 or higher)  - Smoke regularly    Caregivers should wear gloves while washing dishes, handling laundry and cleaning bedrooms and bathrooms.    Wash and dry laundry with special caution. Don't shake dirty laundry, and use the warmest water setting you can.    If you have a weakened immune system, ask your doctor about other actions you should take.    For more tips, go to www.cdc.gov/coronavirus/2019-ncov/downloads/10Things.pdf.    You should not go back to work until you meet the guidelines above for ending your home isolation. You don't need to be retested for COVID-19 before going back to work--studies show that you won't spread the virus if it's been at least 10 days since your symptoms started (or 20 days, if you have a weak immune system).    Employers: This document serves as formal notice of your employee's medical guidelines for going back to work. They must meet the above guidelines before going back to work in person.    How can I take care of myself?    1. Get lots of rest. Drink  extra fluids (unless a doctor has told you not to).    2. Take Tylenol (acetaminophen) for fever or pain. If you have liver or kidney problems, ask your family doctor if it's okay to take Tylenol.     Take either:     650 mg (two 325 mg pills) every 4 to 6 hours, or     1,000 mg (two 500 mg pills) every 8 hours as needed.     Note: Don't take more than 3,000 mg in one day. Acetaminophen is found in many medicines (both prescribed and over-the-counter medicines). Read all labels to be sure you don't take too much.    For children, check the Tylenol bottle for the right dose (based on their age or weight).    3. If you have other health problems (like cancer, heart failure, an organ transplant or severe kidney disease): Call your specialty clinic if you don't feel better in the next 2 days.    4. Know when to call 911: Emergency warning signs include:    Trouble breathing or shortness of breath    Pain or pressure in the chest that doesn't go away    Feeling confused like you haven't felt before, or not being able to wake up    Bluish-colored lips or face    5. Sign up for [employer]. We know it's scary to hear that you have COVID-19. We want to track your symptoms to make sure you're okay over the next 2 weeks. Please look for an email from [employer]--this is a free, online program that we'll use to keep in touch. To sign up, follow the link in the email. Learn more at [email or web address].    Where can I get more information?    [facility]: [email or web address]    Coronavirus Basics: www.health.[facility].[place]/diseases/coronavirus/basics.html    What to Do If You're Sick: www.cdc.gov/coronavirus/2019-ncov/about/steps-when-sick.html    Ending Home Isolation: www.cdc.gov/coronavirus/2019-ncov/hcp/disposition-in-home-patients.html     Caring for Someone with COVID-19: www.cdc.gov/coronavirus/2019-ncov/if-you-are-sick/care-for-someone.html     [facility] clinical trials (COVID-19  research studies): clinicalaffairs.[facility]-clinical-trials     A Positive COVID-19 letter will be sent via [employer] or the mail. (Exception, no letters sent to Presurgerical/Preprocedure Patients)    Alejandra Birch

## 2021-10-16 ENCOUNTER — HEALTH MAINTENANCE LETTER (OUTPATIENT)
Age: 21
End: 2021-10-16

## 2021-12-14 ENCOUNTER — TELEPHONE (OUTPATIENT)
Dept: BEHAVIORAL HEALTH | Facility: CLINIC | Age: 21
End: 2021-12-14
Payer: COMMERCIAL

## 2022-01-26 ENCOUNTER — HOSPITAL ENCOUNTER (OUTPATIENT)
Dept: BEHAVIORAL HEALTH | Facility: CLINIC | Age: 22
Discharge: HOME | End: 2022-01-26
Attending: FAMILY MEDICINE | Admitting: FAMILY MEDICINE
Payer: COMMERCIAL

## 2022-01-26 VITALS — HEIGHT: 72 IN | BODY MASS INDEX: 23.98 KG/M2 | WEIGHT: 177 LBS

## 2022-01-26 PROCEDURE — H0001 ALCOHOL AND/OR DRUG ASSESS: HCPCS | Mod: GT,95

## 2022-01-26 ASSESSMENT — COLUMBIA-SUICIDE SEVERITY RATING SCALE - C-SSRS
TOTAL  NUMBER OF ABORTED OR SELF INTERRUPTED ATTEMPTS PAST 3 MONTHS: NO
TOTAL  NUMBER OF INTERRUPTED ATTEMPTS PAST 3 MONTHS: NO
6. HAVE YOU EVER DONE ANYTHING, STARTED TO DO ANYTHING, OR PREPARED TO DO ANYTHING TO END YOUR LIFE?: NO
4. HAVE YOU HAD THESE THOUGHTS AND HAD SOME INTENTION OF ACTING ON THEM?: NO
TOTAL  NUMBER OF INTERRUPTED ATTEMPTS LIFETIME: NO
5. HAVE YOU STARTED TO WORK OUT OR WORKED OUT THE DETAILS OF HOW TO KILL YOURSELF? DO YOU INTEND TO CARRY OUT THIS PLAN?: NO
TOTAL  NUMBER OF ABORTED OR SELF INTERRUPTED ATTEMPTS PAST LIFETIME: NO
5. HAVE YOU STARTED TO WORK OUT OR WORKED OUT THE DETAILS OF HOW TO KILL YOURSELF? DO YOU INTEND TO CARRY OUT THIS PLAN?: NO
4. HAVE YOU HAD THESE THOUGHTS AND HAD SOME INTENTION OF ACTING ON THEM?: NO
1. IN THE PAST MONTH, HAVE YOU WISHED YOU WERE DEAD OR WISHED YOU COULD GO TO SLEEP AND NOT WAKE UP?: NO
1. IN THE PAST MONTH, HAVE YOU WISHED YOU WERE DEAD OR WISHED YOU COULD GO TO SLEEP AND NOT WAKE UP?: NO
6. HAVE YOU EVER DONE ANYTHING, STARTED TO DO ANYTHING, OR PREPARED TO DO ANYTHING TO END YOUR LIFE?: NO
2. HAVE YOU ACTUALLY HAD ANY THOUGHTS OF KILLING YOURSELF?: NO
ATTEMPT PAST THREE MONTHS: NO
ATTEMPT LIFETIME: NO
2. HAVE YOU ACTUALLY HAD ANY THOUGHTS OF KILLING YOURSELF LIFETIME?: NO
3. HAVE YOU BEEN THINKING ABOUT HOW YOU MIGHT KILL YOURSELF?: NO

## 2022-01-26 ASSESSMENT — ANXIETY QUESTIONNAIRES
6. BECOMING EASILY ANNOYED OR IRRITABLE: SEVERAL DAYS
5. BEING SO RESTLESS THAT IT IS HARD TO SIT STILL: NOT AT ALL
2. NOT BEING ABLE TO STOP OR CONTROL WORRYING: SEVERAL DAYS
1. FEELING NERVOUS, ANXIOUS, OR ON EDGE: SEVERAL DAYS
4. TROUBLE RELAXING: NOT AT ALL
3. WORRYING TOO MUCH ABOUT DIFFERENT THINGS: SEVERAL DAYS
7. FEELING AFRAID AS IF SOMETHING AWFUL MIGHT HAPPEN: NOT AT ALL
GAD7 TOTAL SCORE: 4

## 2022-01-26 ASSESSMENT — PATIENT HEALTH QUESTIONNAIRE - PHQ9: SUM OF ALL RESPONSES TO PHQ QUESTIONS 1-9: 6

## 2022-01-26 ASSESSMENT — MIFFLIN-ST. JEOR: SCORE: 1835.87

## 2022-01-26 ASSESSMENT — PAIN SCALES - GENERAL: PAINLEVEL: NO PAIN (0)

## 2022-01-26 NOTE — PROGRESS NOTES
"[facility]  Provider Name:  Duane David     Credentials:  LPCC, LADC    PATIENT'S NAME: Carl Mccurdy  PREFERRED NAME: \"Avelino"  PRONOUNS:   he/him/his    MRN: [id number]  : 2000  ADDRESS: [address] [place] [id number]  [facility]  [id number]  DATE OF SERVICE: 22  START TIME: 1:00 PM  END TIME: 2:33 PM    PREFERRED PHONE: [phone]  May we leave a program related message: Yes  SERVICE MODALITY:  Video Visit:      Provider verified identity through the following two step process.  Patient provided:  Patient  and Patient address    Telemedicine Visit: The patient's condition can be safely assessed and treated via synchronous audio and visual telemedicine encounter.      Reason for Telemedicine Visit: Patient has requested telehealth visit    Originating Site (Patient Location): Patient's home    Distant Site (Provider Location): Provider Remote Setting- Home Office    Consent:  The patient/guardian has verbally consented to: the potential risks and benefits of telemedicine (video visit) versus in person care; bill my insurance or make self-payment for services provided; and responsibility for payment of non-covered services.     Patient would like the video invitation sent by:  Send to e-mail at: [email or web address]    Mode of Communication:  Video Conference via Amwell    As the provider I attest to compliance with applicable laws and regulations related to telemedicine.    UNIVERSAL ADULT Substance Use Disorder DIAGNOSTIC ASSESSMENT    Identifying Information:  Patient is a 21 year old,   .  The pronoun use throughout this assessment reflects the patient's chosen pronoun.  Patient was referred for an assessment by court ordered by [facility] .  Patient attended the session alone.    Chief Complaint:   The reason for seeking services at this time is: \"Because I was driving and speeding on labor day " "weekend, got pulled over and been recommended that he undergoes a chemical evaluation\"   The problem(s) began \"Labor Day 2021 weekend\". Patient has not attempted to resolve these concerns in the past.  Patient does not appear to be in severe withdrawal, an imminent safety risk to self or others, or requiring immediate medical attention and may proceed with the assessment interview.    Social/Family History:  Patient reported that he grew up in \"[place], [place]\".  He was raised by \"his mother\".  Patient reported that his childhood was \"not bad, dad left when he was a baby, only remembered seeing his step-father\".  Patient describes current relationships with family of origin as \"good\".      The patient describes his cultural background as \"-[place]itt[place]\".  Cultural influences and impact on patient's life structure, values, norms, and healthcare: none identified.  Contextual influences on patient's health include: Individual Factors \"occasioanl drinking\".  Patient identified his preferred language to be English. Patient reported that he does not need the assistance of an  or other support involved in therapy.  Patient reports that he is not involved in community of varun activities. He reports spirituality impacts recovery in the following ways:  None reported.     Patient reported had no significant delays in developmental tasks.   Patient's highest education level was high school graduate. Patient identified the following learning problems: speech and learning problems.  Patient reports that he is  able to understand written materials.    Patient reported the following relationship history NA.  Patient's current relationship status is in a relationship  for a month.   Patient identified his sexual orientation as heterosexual.  Patient reported having zero children of his own but has a step-daughter.     Patient's current living/housing situation involves staying in own home/apartment. He lives with " "his mother and stepfather and they report that housing is stable. Patient identified partner, mother and relatives as part of his support system.  Patient identified the quality of these relationships as good.      Patient reports engaging in the following recreational/leisure activities: \"listening to music, playing videos, family events and play sports\". Patient denies engaging in any recreation/leisure activities while using. Patient reports the following people are supportive of recovery: \"his father, mother, girlfriend and relatives\".  Patient is currently employed part time and reports they are able to function appropriately at work.  Patient reports that his income is obtained through employment and parents.  Patient does identify finances as a current stressor.      Patient reports the following substance related arrests or legal issues: \"DWI on Labor Day 2021\".  Patient does report being on probation / parole / under the jurisdiction of the court: : Marilyn Lewis (PH:[phone]; F: [phone]). County: [place].    Patient's Strengths and Limitations:  Patient identified the following strengths or resources that will help them succeed in treatment: commitment to health and well being, community involvement, exercise routine, varun / spirituality, friends / good social support, family support, insight, motivation and sober support group / recovery support . Things that may interfere with the patient's success in treatment include: few friends, financial hardship and lack of social support.     Assessments:  The following assessments were completed by patient for this visit:  PHQ9:   PHQ-9 SCORE 1/26/2022   PHQ-9 Total Score 6     GAD7:   CY-7 SCORE 1/26/2022   Total Score 4     [place] Suicide Severity Rating Scale (Short Version)No flowsheet data found.  GAIN-sliding scale:  When was the last time that you had significant problems... 1/26/2022   with feeling very trapped, lonely, " sad, blue, depressed or hopeless about the future? 1+ years ago   with sleep trouble, such as bad dreams, sleeping restlessly, or falling asleep during the day? 1+ years ago   with feeling very anxious, nervous, tense, scared, panicked or like something bad was going to happen? 1+ years ago   with becoming very distressed & upset when something reminded you of the past? 1+ years ago   with thinking about ending your life or committing suicide? Never      When was the last time that you did the following things 2 or more times? 1/26/2022   Lied or conned to get things you wanted or to avoid having to do something? Never   Had a hard time paying attention at school, work or home? Past month   Had a hard time listening to instructions at school, work or home? Never   Were a bully or threatened other people? Never   Started physical fights with other people? 1+ years ago       Personal and Family Medical History:  Patient did report a family history of mental health concerns.  Patient reports family history includes Anxiety Disorder in his father, maternal grandfather, maternal grandmother, mother, paternal grandfather, and paternal grandmother; Depression in his father, maternal grandfather, maternal grandmother, mother, paternal grandfather, and paternal grandmother..      Patient reported no previous mental health diagnoses.  Patient has not received mental health services in the past.  Psychiatric Hospitalizations: None.  Patient denies a history of civil commitment.  Current mental health services/providers include:  None reported.    Patient has not had a physical exam to rule out medical causes for current symptoms.  Date of last physical exam was greater than a year ago and client was encouraged to schedule an exam with PCP. The patient has a [facility] Provider, who is named Christopher Drake.  Patient reports no current medical and/or dental concerns.  Patient denies any issues with pain.. Patient  denies pregnancy..  There are not significant appetite / nutritional concerns / weight changes.  Patient does not report a history of an eating disorder.  Patient does not report a history of head injury / trauma / cognitive impairment.      Patient reports current meds as:   Outpatient Medications Marked as Taking for the 1/26/22 encounter (Hospital Encounter) with Duane David LADC   Medication Sig     fluticasone (FLONASE) 50 MCG/ACT nasal spray Spray 1 spray into both nostrils daily     IBUPROFEN PO      sertraline (ZOLOFT) 50 MG tablet [SERTRALINE (ZOLOFT) 50 MG TABLET] Take 1 tablet (50 mg total) by mouth daily.       Medication Adherence:  Patient reports taking prescribed medications as prescribed.      Patient Allergies:  No Known Allergies    Medical History:  History reviewed. No pertinent past medical history.          Substance Use:  Patient reported no family history of chemical health issues.  Patient has not received substance use disorder and/or gambling treatment in the past.  Patient has never been to detox.  Patient is not currently receiving any chemical dependency treatment. Patient reports no history of support group attendance.        Substance Age of first use Pattern and duration of use (include amounts and frequency) Date of last use     Withdrawal potential Route of administration   has used Alcohol 19 Drinking 3 shots during family events, holidays.  Labor Day weekends 2021- took 3 shots and a beer. 09/04/21 No oral   has not used Marijuana   NA NA NA NA  NA     has not used Amphetamines   NA NA NA NA  NA   has not used Cocaine/crack    NA NA NA NA  NA   has not used Hallucinogens NA NA NA NA  NA   has not used Inhalants NA NA NA NA  NA   has not used Heroin NA NA NA NA  NA   has not used Other Opiates NA NA NA NA  NA   has not used Benzodiazepine   NA NA NA NA  NA   has not used Barbiturates NA NA NA NA  NA   has not used Over the counter meds. NA NA NA NA  NA   has not use  "Caffeine NA NA NA NA  NA   has used Nicotine  18 Smoked 2 cigarettes daily. October 2021 NA  smoked   has not used other substances not listed above:  Identify:  NA NA NA NA  NA       Patient reported the following problems as a result of his substance use: DUI and legal issues.  Patient is not concerned about his alcohol use.  Patient reports that his recovery goals are \"moving out from his parents' house, maintain abstinence\".     Patient reports experiencing the following withdrawal symptoms within the past 12 months: none and the following within the past 30 days: none.   Patients reports no urges to use Alcohol.  Patient reports he has used more Alcohol than intended or over a longer period of time than intended. Patient reports he has had unsuccessful attempts to cut down or control use of Alcohol.  Patient reports longest period of abstinence was \"a year\" and return to use was due to \"to have fun\". Patient reports he has needed to use more Alcohol to achieve the same effect.  Patient does  report diminished effect with use of same amount of Alcohol.     Patient does not report a great deal of time is spent in activities necessary to obtain, use, or recover from Alcohol effects.  Patient does not report important social, occupational, or recreational activities are given up or reduced because of Alcohol use.  Alcohol use is continued despite knowledge of having a persistent or recurrent physical or psychological problem that is likely to have caused or exacerbated by use.  Patient denies any problem behaviors while under the influence of substances except for his current legal issue.     Patient reports substance use has never impacted his ability to function in a school setting. Patient reports substance use has never impacted his ability to function in a work setting.  Patient demographics and history has not impact his recovery in any ways.  Patient reports engaging in the following recreation/leisure " "activities while using:  \"soical events\".  Patient reports the following people are supportive of recovery: \"his father, mother, girlfriend and relatives\"    Patient does not have a history of gambling concerns and/or treatment.  Patient does not have other addictive behaviors he is concerned about.        Dimension Scale Ratings:    Dimension 1 -  Acute Intoxication/Withdrawal: 0 - No Problem No signs/symptoms of intoxication or withdrawal observed.  Dimension 2 - Biomedical: 0 - No Problem No health concerns reported.  Dimension 3 - Emotional/Behavioral/Cognitive Conditions: 1 - Minor Problem Patient reports no mental health diagnosis but is taking medication for his anxiety symptoms at this time.  He denies any suicidal ideation.  Screenings indicate minimal depression and anxiety symptoms.  Dimension 4 - Readiness to Change:  1 - Minor Problem Patient does not feel his drinking is a problem. He was cooperative, compliant with evaluation and willing to abstain to better self.  Dimension 5 - Relapse/Continued Use/ Continued Problem Potential: 1 - Minor Problem Patient reports no history of treatment or support groups.  He lacks education on addiction or insight into the negative impact his alcohol use.  Dimension 6 - Recovery Environment:  1 - Minor Problem Patient is a single male in a relationship for a month, residing with his father, mother, girlfriend and stepdaughter. He is employed part-time and denies any job concerns.  He lacks sober support and currently on probation in [facility] for his first DWI.    Significant Losses / Trauma / Abuse / Neglect Issues:   Patient reports that he did not serve in the .  There are indications or report of significant loss, trauma, abuse or neglect issues related to: are no indications and client denies any losses, trauma, abuse, or neglect concerns.  Concerns for possible neglect are not present. NA    Safety Assessment:   Patient denies current homicidal " ideation and behaviors.  Patient denies current self-injurious ideation and behaviors.    Patient reported unsafe motor vehicle operation associated with substance use.  Patient denies any high risk behaviors associated with mental health symptoms.  Patient reports the following current concerns for his personal safety: None.  Patient reports there are no firearms in the house.      History of Safety Concerns:  Patient denied a history of homicidal ideation.     Patient denied a history of personal safety concerns.    Patient denied a history of assaultive behaviors.    Patient denied a history of sexual assault behaviors.     Patient reported a history unsafe motor vehicle operation associated with substance use.  Patient denies any history of high risk behaviors associated with mental health symptoms.  Patient reports the following protective factors:      Risk Plan:  See Recommendations for Safety and Risk Management Plan    Review of Symptoms per patient report:  Substance Use:  hangovers and substance related legal problems     Diagnostic Criteria:  Substance Use Disorder Substance is often taken in larger amounts or over a longer period than was intended.  Met for:  Alcohol Recurrent use of the substance in which it is physically hazardous.  Met for:  Alcohol Tolerance:  either a need for markedly increased amounts of the substance to achieve the desired effect or a markedly diminished effect with continued use of the dame amount of the substance.  Met for:  Alcohol          Collateral Contact Summary:   Collateral contacts contributing to this assessment:  NA    If court related records were reviewed, summarize here: NA    Information from collateral contacts supported/largely agreed with information from the client and associated risk ratings.    Information in this assessment was obtained from the medical record and provided by patient who is a fair historian.    Patient will have open access to their  mental health medical record.    Diagnostic Criteria: 1.) Alcohol/drug is often taken in larger amounts or over a longer period than was intended.  Met for Alcohol.  8.) Recurrent alcohol/drug use in situations in which it is physically hazardous.  Met for Alcohol.  10.) Tolerance, as defined by either of the following: A need for markedly increased amounts of alcohol/drug to achieve intoxication or desired effect..  Met for Alcohol.      As evidenced by self report and criteria, client meets the following DSM5 Diagnoses:   (Sustained by DSM5 Criteria Listed Above)  Alcohol Use Disorder   305.00 (F10.10) Mild Sustained.    Recommendations:     1. Plan for Safety and Risk Management:  Recommended that patient call 911 or go to the local ED should there be a change in any of these risk factors..      Report to child / adult protection services was NA.     2. [facility] Referrals:   Recommendations:    1)  Patient should complete a MADD Panel Class or Driving With Care Level One class.   2)  Patient should abstain from all mood-altering chemicals unless prescribed by a licensed provider.   3)  Patient should remain law abiding and follow all recommendations of Probation    Patient reports that he is willing to follow these recommendations.  Patient would like the following family or other support people involved in his treatment:  none. Patient does not have a history of opiate use.    3. Mental Health Referrals:  None.     4. Recommendations for treatment focus:   Alcohol / Substance Use - Abstinence.      assessment and conclusion, assessment risk and appropriate steps taken, documentation to support the diagnosis, rationale for disposition and appropriate level of care recommended and alternative for treatment options.     Patient is a 21-year-old heterosexual / single male, with no children.      ASSESSMENT SUMMARY:      D1 ADMIT RATING (0) No signs/symptoms of intoxication or withdrawal  observed.     D2 ADMIT RATING (0) No health concerns reported.     D3 ADMIT RATING (1) Patient reports no mental health diagnosis but is taking medication for his anxiety symptoms at this time.  He denies any suicidal ideation.  Screenings indicate minimal depression and anxiety symptoms.    D4 ADMIT RATING (1) Patient does not feel his drinking is a problem. He was cooperative, compliant with evaluation and willing to abstain to better self.     D5 ADMIT RATING (1) Patient reports no history of treatment or support groups.  He lacks education on addiction or insight into the negative impact his alcohol use.     DIM. 6 ADMIT RATING (1) Patient is a single male in a relationship for a month, residing with his father, mother, girlfriend and stepdaughter. He is employed part-time and denies any job concerns.  He lacks sober support and currently on probation in [facility] for his first DWI.     Rational for recommended level of care: Patient lacks long-term sober maintenance skills, and sober peer support network.     [facility] Referrals: None.    Daanes:Record has been saved! Assessment ID: [id number]    Provider Name/ Credentials:  Duane David MA, [facility], [facility]  Substance Use Assessment  Phone: [phone]  Fax: [phone]    January 26, 2022

## 2022-01-27 ASSESSMENT — ANXIETY QUESTIONNAIRES: GAD7 TOTAL SCORE: 4

## 2022-01-27 NOTE — ADDENDUM NOTE
Enc[facility] on: 1/27/2022 8:33 AM   Actions taken: Clinical Note Signed

## 2022-02-17 ENCOUNTER — OFFICE VISIT (OUTPATIENT)
Dept: FAMILY MEDICINE | Facility: CLINIC | Age: 22
End: 2022-02-17
Payer: COMMERCIAL

## 2022-02-17 VITALS
WEIGHT: 164.8 LBS | HEART RATE: 77 BPM | RESPIRATION RATE: 18 BRPM | TEMPERATURE: 97.1 F | HEIGHT: 71 IN | DIASTOLIC BLOOD PRESSURE: 70 MMHG | SYSTOLIC BLOOD PRESSURE: 110 MMHG | BODY MASS INDEX: 23.07 KG/M2 | OXYGEN SATURATION: 99 %

## 2022-02-17 DIAGNOSIS — F12.21 CANNABIS USE DISORDER, MODERATE, IN SUSTAINED REMISSION (H): ICD-10-CM

## 2022-02-17 DIAGNOSIS — F10.10 ALCOHOL ABUSE, EPISODIC DRINKING BEHAVIOR: ICD-10-CM

## 2022-02-17 DIAGNOSIS — F33.1 MODERATE RECURRENT MAJOR DEPRESSION (H): Primary | ICD-10-CM

## 2022-02-17 PROCEDURE — 90471 IMMUNIZATION ADMIN: CPT | Performed by: PHYSICIAN ASSISTANT

## 2022-02-17 PROCEDURE — 90686 IIV4 VACC NO PRSV 0.5 ML IM: CPT | Performed by: PHYSICIAN ASSISTANT

## 2022-02-17 PROCEDURE — 99214 OFFICE O/P EST MOD 30 MIN: CPT | Mod: 25 | Performed by: PHYSICIAN ASSISTANT

## 2022-02-17 PROCEDURE — 90715 TDAP VACCINE 7 YRS/> IM: CPT | Performed by: PHYSICIAN ASSISTANT

## 2022-02-17 PROCEDURE — 90472 IMMUNIZATION ADMIN EACH ADD: CPT | Performed by: PHYSICIAN ASSISTANT

## 2022-02-17 RX ORDER — SERTRALINE HYDROCHLORIDE 100 MG/1
100 TABLET, FILM COATED ORAL DAILY
Qty: 90 TABLET | Refills: 3 | Status: SHIPPED | OUTPATIENT
Start: 2022-02-17

## 2022-02-17 ASSESSMENT — PATIENT HEALTH QUESTIONNAIRE - PHQ9
10. IF YOU CHECKED OFF ANY PROBLEMS, HOW DIFFICULT HAVE THESE PROBLEMS MADE IT FOR YOU TO DO YOUR WORK, TAKE CARE OF THINGS AT HOME, OR GET ALONG WITH OTHER PEOPLE: SOMEWHAT DIFFICULT
SUM OF ALL RESPONSES TO PHQ QUESTIONS 1-9: 9
SUM OF ALL RESPONSES TO PHQ QUESTIONS 1-9: 9

## 2022-02-17 ASSESSMENT — ANXIETY QUESTIONNAIRES
6. BECOMING EASILY ANNOYED OR IRRITABLE: SEVERAL DAYS
7. FEELING AFRAID AS IF SOMETHING AWFUL MIGHT HAPPEN: SEVERAL DAYS
1. FEELING NERVOUS, ANXIOUS, OR ON EDGE: NOT AT ALL
GAD7 TOTAL SCORE: 4
4. TROUBLE RELAXING: NOT AT ALL
GAD7 TOTAL SCORE: 4
7. FEELING AFRAID AS IF SOMETHING AWFUL MIGHT HAPPEN: SEVERAL DAYS
3. WORRYING TOO MUCH ABOUT DIFFERENT THINGS: SEVERAL DAYS
GAD7 TOTAL SCORE: 4
2. NOT BEING ABLE TO STOP OR CONTROL WORRYING: SEVERAL DAYS
5. BEING SO RESTLESS THAT IT IS HARD TO SIT STILL: NOT AT ALL

## 2022-02-17 ASSESSMENT — PAIN SCALES - GENERAL: PAINLEVEL: NO PAIN (0)

## 2022-02-17 NOTE — PROGRESS NOTES
Assessment & Plan   Moderate recurrent major depression (H)  Patient presents for depression recheck. Noted increase in anxiety and depression symptoms. The Zoloft 50 mg had been working well for him, but noticed increase in symptoms over past few weeks. He has noted turning to substances like alcohol when he is feeling depressed. I think it's reasonable to increase his zoloft to 100 mg to help his depression and anxiety. Patient requested referral for therapy, which I think is a great decision in his end to help with talking about things and learning coping skills. We will trial this for the next 4 weeks, and recheck at that point.   - REVIEW OF HEALTH MAINTENANCE PROTOCOL ORDERS  - sertraline (ZOLOFT) 100 MG tablet; Take 1 tablet (100 mg) by mouth daily  - Adult Mental Health  Referral; Future    Alcohol abuse, episodic drinking behavior  Patient notes increase in drinking while mood had been down. He has a history of alcohol abuse which has resulted in a DUI. We discussed the options of Naltrexone and gabapentin for alcohol abuse. We both agreed that going the conversative route with only increasing his zoloft and a therapy referral is a good option at this point. Patient will revisit these ideas in a few months or if alcohol abuse increases. He can follow up at any time.     Cannabis use disorder, moderate, in sustained remission (H)  In remission. Encouraged ongoing cessation.     Return in about 4 weeks (around 3/17/2022) for Depression recheck.    PRITESH Valdez-S2  Tr Lozada PA-C  [facility]    Negin Henry is a 21 year old who presents for the following health issues     History of Present Illness       Mental Health Follow-up:  Patient presents to follow-up on Depression & Anxiety.Patient's depression since last visit has been:  Medium  The patient is not having other symptoms associated with depression.  Patient's anxiety since last visit has been:  " Medium  The patient is not having other symptoms associated with anxiety.  Any significant life events: No  Patient is feeling anxious or having panic attacks.  Patient has concerns about alcohol or drug use.     Social History  Tobacco Use    Smoking status: Former Smoker      Types: Cigarettes    Smokeless tobacco: Current User    Tobacco comment: e-cigarette use  Alcohol use: Not on file  Drug use: Yes    Frequency: 1.0 times per week    Types: Marijuana      Today's PHQ-9         PHQ-9 Total Score:     (P) 9   PHQ-9 Q9 Thoughts of better off dead/self-harm past 2 weeks :   (P) Not at all   Thoughts of suicide or self harm:      Self-harm Plan:        Self-harm Action:          Safety concerns for self or others:           He eats 2-3 servings of fruits and vegetables daily.He consumes 4 sweetened beverage(s) daily.He exercises with enough effort to increase his heart rate 20 to 29 minutes per day.  He exercises with enough effort to increase his heart rate 4 days per week. He is missing 1 dose(s) of medications per week.     Denies panic attacks.   Depressed - no longer smoking or doing drugs. Hasn't smoked since last September. Recently starting drinking more to help mood which makes him more depressed. Drinks hard alcohol up to 6 shots until feels fatigued enough to fall asleep.   History of alcoholism in family.     Therapy referral.    Review of Systems   See HPI       Objective    /70 (BP Location: Right arm, Patient Position: Sitting, Cuff Size: Adult Regular)   Pulse 77   Temp 97.1  F (36.2  C) (Tympanic)   Resp 18   Ht 1.81 m (5' 11.25\")   Wt 74.8 kg (164 lb 12.8 oz)   SpO2 99%   BMI 22.82 kg/m    Body mass index is 22.82 kg/m .  Physical Exam   Constitutional: healthy, alert, and no distress  Head: Normocephalic. Atraumatic  Eyes: No conjunctival injection, sclera anicteric  Respiratory: No resp distress.  Musculoskeletal: extremities normal- no gross deformities noted, and normal muscle " tone  Neurologic: Gait normal. CN 2-12 grossly intact  Psychiatric: mentation appears normal and affect normal/bright     Physician Attestation   I, Tr Lozada, was present with the medical/JOEY student who participated in the service and in the documentation of the note.  I have verified the history and personally performed the physical exam and medical decision making.  I agree with the assessment and plan of care as documented in the note.      Tr Lozada PA-C

## 2022-02-17 NOTE — PATIENT INSTRUCTIONS
Increase Zoloft to 100 mg daily. See me back in clinic in 4 weeks.     Mental Health team will call you to schedule something. If not, or too far out you can try  [email or web address]  Kevin and Malik    We talked about Naltrexone and gabapentin for alcohol use. Think about it, and let me know in the future.

## 2022-02-18 ASSESSMENT — PATIENT HEALTH QUESTIONNAIRE - PHQ9: SUM OF ALL RESPONSES TO PHQ QUESTIONS 1-9: 9

## 2022-02-18 ASSESSMENT — ANXIETY QUESTIONNAIRES: GAD7 TOTAL SCORE: 4

## 2022-03-17 ENCOUNTER — OFFICE VISIT (OUTPATIENT)
Dept: FAMILY MEDICINE | Facility: CLINIC | Age: 22
End: 2022-03-17
Payer: COMMERCIAL

## 2022-03-17 VITALS
WEIGHT: 166.8 LBS | TEMPERATURE: 98 F | BODY MASS INDEX: 23.35 KG/M2 | HEART RATE: 76 BPM | DIASTOLIC BLOOD PRESSURE: 84 MMHG | HEIGHT: 71 IN | RESPIRATION RATE: 18 BRPM | OXYGEN SATURATION: 99 % | SYSTOLIC BLOOD PRESSURE: 116 MMHG

## 2022-03-17 DIAGNOSIS — Z23 NEED FOR VACCINATION: ICD-10-CM

## 2022-03-17 DIAGNOSIS — F10.10 ALCOHOL ABUSE, EPISODIC DRINKING BEHAVIOR: ICD-10-CM

## 2022-03-17 DIAGNOSIS — F33.1 MODERATE RECURRENT MAJOR DEPRESSION (H): Primary | ICD-10-CM

## 2022-03-17 PROBLEM — F12.21 CANNABIS USE DISORDER, MODERATE, IN SUSTAINED REMISSION (H): Status: ACTIVE | Noted: 2022-03-17

## 2022-03-17 PROBLEM — F12.20 TETRAHYDROCANNABINOL (THC) USE DISORDER, MODERATE, DEPENDENCE (H): Status: RESOLVED | Noted: 2018-08-30 | Resolved: 2022-03-17

## 2022-03-17 PROCEDURE — 90651 9VHPV VACCINE 2/3 DOSE IM: CPT | Performed by: PHYSICIAN ASSISTANT

## 2022-03-17 PROCEDURE — 99214 OFFICE O/P EST MOD 30 MIN: CPT | Mod: 25 | Performed by: PHYSICIAN ASSISTANT

## 2022-03-17 PROCEDURE — 90471 IMMUNIZATION ADMIN: CPT | Performed by: PHYSICIAN ASSISTANT

## 2022-03-17 ASSESSMENT — ANXIETY QUESTIONNAIRES
3. WORRYING TOO MUCH ABOUT DIFFERENT THINGS: SEVERAL DAYS
5. BEING SO RESTLESS THAT IT IS HARD TO SIT STILL: NOT AT ALL
2. NOT BEING ABLE TO STOP OR CONTROL WORRYING: SEVERAL DAYS
GAD7 TOTAL SCORE: 2
1. FEELING NERVOUS, ANXIOUS, OR ON EDGE: NOT AT ALL
6. BECOMING EASILY ANNOYED OR IRRITABLE: NOT AT ALL
7. FEELING AFRAID AS IF SOMETHING AWFUL MIGHT HAPPEN: NOT AT ALL

## 2022-03-17 ASSESSMENT — PATIENT HEALTH QUESTIONNAIRE - PHQ9
5. POOR APPETITE OR OVEREATING: NOT AT ALL
SUM OF ALL RESPONSES TO PHQ QUESTIONS 1-9: 4

## 2022-03-17 ASSESSMENT — PAIN SCALES - GENERAL: PAINLEVEL: NO PAIN (0)

## 2022-03-17 NOTE — LETTER
March 17, 2022      Carl Mccurdy  [address] [place] [id number]        To Whom It May Concern:    Carl Mccurdy was seen in our clinic. Please excuse from work today 3/17/22. He may return to work without restrictions.      Sincerely,        Tr Lozada PA-C

## 2022-03-17 NOTE — PROGRESS NOTES
Assessment & Plan   Moderate recurrent major depression (H)  Pt reports significant improvements in depression symptoms since initiating Sertraline 100 mg. PHQ score reduced to a 4. Patient denies suicidal ideation. Desires to continue current medications. Pt to follow up if symptoms worsen.    Need for vaccination  Educated on HPV vaccination risks/benefits. Patient desires dose of vaccination today.  - HPV, IM (9 - 26 YRS) - Gardasil 9    Alcohol abuse, episodic drinking behavior  Pt reports that he has not been engaging in alcohol consumption now that depression symptoms have improved with dose increase. If alcohol abuse increases he can follow up at any time to discuss initiating Naltrexone or Gabapentin.     Return in about 6 months (around 9/17/2022), or if symptoms worsen or fail to improve, for In-Clinic Visit.    ANAMARIA [facility]    Negin Henry is a 21 year old who presents for the following health issues     HPI   Depression Followup    How are you doing with your depression since your last visit? Improved    Are you having other symptoms that might be associated with depression? No    Have you had a significant life event?  No     Are you feeling anxious or having panic attacks?   No    Do you have any concerns with your use of alcohol or other drugs? No    Social History     Tobacco Use     Smoking status: Former Smoker     Types: Cigarettes     Smokeless tobacco: Current User     Tobacco comment: e-cigarette use   Vaping Use     Vaping Use: Every day     Substances: Nicotine     Devices: Disposable, Pre-filled or refillable cartridge   Substance Use Topics     Alcohol use: None     Drug use: Yes     Frequency: 1.0 times per week     Types: Marijuana     PHQ 1/26/2022 2/17/2022   PHQ-9 Total Score 6 9   Q9: Thoughts of better off dead/self-harm past 2 weeks Not at all Not at all     CY-7 SCORE 1/26/2022 2/17/2022   Total Score - 4 (minimal anxiety)  "  Total Score 4 4       Suicide Assessment Five-step Evaluation and Treatment (SAFE-T)    Review of Systems   See HPI       Objective    /84 (BP Location: Right arm, Patient Position: Sitting, Cuff Size: Adult Regular)   Pulse 76   Temp 98  F (36.7  C) (Tympanic)   Resp 18   Ht 1.81 m (5' 11.25\")   Wt 75.7 kg (166 lb 12.8 oz)   SpO2 99%   BMI 23.10 kg/m    Body mass index is 23.1 kg/m .  Physical Exam   Constitutional: healthy, alert, and no distress  Head: Normocephalic. Atraumatic  Eyes: No conjunctival injection, sclera anicteric  Respiratory: No resp distress.  Musculoskeletal: extremities normal- no gross deformities noted, and normal muscle tone  Neurologic: Gait normal. CN 2-12 grossly intact  Psychiatric: mentation appears normal and affect normal/bright     Physician Attestation   I, Tr Lozada, was present with the medical/JOEY student who participated in the service and in the documentation of the note.  I have verified the history and personally performed the physical exam and medical decision making.  I agree with the assessment and plan of care as documented in the note.      Tr Lozada PA-C        "

## 2022-03-18 ASSESSMENT — ANXIETY QUESTIONNAIRES: GAD7 TOTAL SCORE: 2

## 2022-04-20 ENCOUNTER — OFFICE VISIT (OUTPATIENT)
Dept: URGENT CARE | Facility: URGENT CARE | Age: 22
End: 2022-04-20
Payer: COMMERCIAL

## 2022-04-20 VITALS
TEMPERATURE: 97.8 F | BODY MASS INDEX: 23.96 KG/M2 | WEIGHT: 173 LBS | HEART RATE: 100 BPM | DIASTOLIC BLOOD PRESSURE: 70 MMHG | SYSTOLIC BLOOD PRESSURE: 100 MMHG | OXYGEN SATURATION: 97 %

## 2022-04-20 DIAGNOSIS — S61.411A LACERATION OF RIGHT HAND WITHOUT FOREIGN BODY, INITIAL ENCOUNTER: Primary | ICD-10-CM

## 2022-04-20 PROCEDURE — 12001 RPR S/N/AX/GEN/TRNK 2.5CM/<: CPT | Performed by: PHYSICIAN ASSISTANT

## 2022-04-20 NOTE — LETTER
[facility]  [address] [address]  Phone: [phone]  Fax: [phone]    April 20, 2022        Carl Mccurdy  [address] [place] [id number]          To whom it may concern:    RE: Carl Mccurdy    Patient was seen and treated today at our clinic.    Please contact me for questions or concerns.      Sincerely,        Drea Caldera PA-C

## 2022-04-20 NOTE — PATIENT INSTRUCTIONS
Keep dry for 48 hours, then ok to get wet but do not soak  Monitor for signs of infection (spreading redness or worsening pain)  Return to clinic in 10-14 days for suture removal (schedule nurse visit)

## 2022-04-20 NOTE — PROGRESS NOTES
Assessment & Plan     1. Laceration of right hand without foreign body, initial encounter  Keep dry for 48 hours, then ok to get wet but do not soak, monitor for signs of infection (spreading redness or worsening pain). Return to clinic in 10-14 days for suture removal (schedule nurse visit)    - REPAIR SUPERFICIAL, WOUND FACE/EAR <2.5 CM                  No follow-ups on file.    ANAMARIA [facility]            Subjective   Chief Complaint   Patient presents with     Laceration     Right hand laceration, dropped coffee mug and it broke and pieces broke and cut hand.        HPI     Laceration    Mechanism of injury: cut on coffee mug  History provided by: Patient  Time of injury was 1 hours(s) ago.    This is a non-work related injury.    Associated symptoms: Denies numbness, weakness, or loss of function    Last tetanus booster within 10 years: Yes    LACERATION EXAM:   Size of laceration: 2 centimeters  Locations: side of right hand   Characteristics of the laceration: clean  Depth of laceration: superficial  Tendon function intact: Yes  Sensation to light touch intact: Yes  Pulses/capillary refill intact: Yes  Foreign body: No    Picture included in patient's chart: no    PROCEDURE NOTE:  Anesthesia: Wound was locally injected with 2.5 cc's of  Lidocaine 1% plain  Prepped and draped in the usual sterile fashion  Wound irrigated with sterile water  Wound was explored for any foreign bodies and evaluated for tendon, nerve, vessel or joint involvement.    Closure was simple  Laceration was closed with 4 X 4.0 Nylon interrupted sutures  Bandage was applied  Patient tolerated the procedure well                        Review of Systems   Constitutional, HEENT, cardiovascular, pulmonary, gi and gu systems are negative, except as otherwise noted.      Objective    /70   Pulse 100   Temp 97.8  F (36.6  C) (Tympanic)   Wt 78.5 kg (173 lb)   SpO2 97%   BMI 23.96  kg/m    Body mass index is 23.96 kg/m .  Physical Exam  Constitutional:       General: He is not in acute distress.  Skin:     Comments: See procedure not above    Neurological:      Mental Status: He is alert.

## 2022-05-16 ENCOUNTER — VIRTUAL VISIT (OUTPATIENT)
Dept: PSYCHOLOGY | Facility: CLINIC | Age: 22
End: 2022-05-16
Payer: COMMERCIAL

## 2022-05-16 DIAGNOSIS — F33.1 MODERATE RECURRENT MAJOR DEPRESSION (H): Primary | ICD-10-CM

## 2022-05-16 PROCEDURE — 90834 PSYTX W PT 45 MINUTES: CPT | Mod: GT | Performed by: MARRIAGE & FAMILY THERAPIST

## 2022-05-16 ASSESSMENT — COLUMBIA-SUICIDE SEVERITY RATING SCALE - C-SSRS
1. IN THE PAST MONTH, HAVE YOU WISHED YOU WERE DEAD OR WISHED YOU COULD GO TO SLEEP AND NOT WAKE UP?: NO
TOTAL  NUMBER OF INTERRUPTED ATTEMPTS LIFETIME: NO
TOTAL  NUMBER OF ABORTED OR SELF INTERRUPTED ATTEMPTS LIFETIME: NO
ATTEMPT LIFETIME: NO
1. HAVE YOU WISHED YOU WERE DEAD OR WISHED YOU COULD GO TO SLEEP AND NOT WAKE UP?: YES
6. HAVE YOU EVER DONE ANYTHING, STARTED TO DO ANYTHING, OR PREPARED TO DO ANYTHING TO END YOUR LIFE?: NO
2. HAVE YOU ACTUALLY HAD ANY THOUGHTS OF KILLING YOURSELF?: NO

## 2022-05-16 NOTE — PROGRESS NOTES
[facility]                                     Progress Note    Patient Name: Carl Mccurdy  Date: 22         Service Type: Individual      Session Start Time: 11:15a  Session End Time: 12:00p     Session Length: 45    Session #: 1    Attendees: Client attended alone    Service Modality:  Video Visit:      Provider verified identity through the following two step process.  Patient provided:  Patient     Telemedicine Visit: The patient's condition can be safely assessed and treated via synchronous audio and visual telemedicine encounter.      Reason for Telemedicine Visit: Services only offered telehealth    Originating Site (Patient Location): Patient's place of employment    Distant Site (Provider Location): Provider Remote Setting- Home Office    Consent:  The patient/guardian has verbally consented to: the potential risks and benefits of telemedicine (video visit) versus in person care; bill my insurance or make self-payment for services provided; and responsibility for payment of non-covered services.     Patient would like the video invitation sent by:  Send to e-mail at: [email or web address]    Mode of Communication:  Video Conference via [employer]    As the provider I attest to compliance with applicable laws and regulations related to telemedicine.    DATA  Interactive Complexity: No  Crisis: No        Progress Since Last Session (Related to Symptoms / Goals / Homework):   Symptoms: No change Mood at 5/10    Homework: Completed in session      Episode of Care Goals: Minimal progress - ACTION (Actively working towards change); Intervened by reinforcing change plan / affirming steps taken     Current / Ongoing Stressors and Concerns:   PT did CD eval in  after legal issues being on probation. 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 Pt lives with mom, girlfriend, and their 4  year old daughter. Living situation is stable and pt is wanting to get his own apartment.    PT notes having some MH issues over the past few years and using drugs was his only outlet to improve his mood. PT had previous legal issues from driving under the influence and is on probation until 2022. Pt is currently sober off all substances from 2.5 months and is taking zoloft for the past few months and it appears to be helping a mild amount. His mood over the past few months has been improving with the new job and his daughter.    PT notes that over the past years, he will have increasing irritability and being upset with himself over not being able to do things he used to do when younger like play sports or have fun without using substances. When having free time, he will like to spend time with cousins and play video games. Pt feels the relationship with his girlfriend is mostly positive.    PT's bio father left the family when pt was a baby and there has been no relationship with him since then. His father was a substance user and abusive. PT will notice his depressive sx increasing when he is home alone and this will lead to looking for alcohol. Fam hx of depression with his mother and anxiety/depression with father.     Trauma: Pt 12, best friend overdosed and . Grandmother passed in . PT has some legal issues.     Goals: To learn to focus less on his using past and focusing more on the future. Motivation to remain sober is for his daughter's future and her seeing him as a good father.         Treatment Objective(s) Addressed in This Session:   Identify negative self-talk and behaviors: challenge core beliefs, myths, and actions       Intervention:   Motivational Interviewing    MI Intervention: Supported Autonomy, Collaboration, Evocation, Permission to raise concern or advise, Open-ended questions and Reflections: simple and complex     Change Talk Expressed by the Patient: Reasons to change Need  to change Committment to change    Provider Response to Change Talk: E - Evoked more info from patient about behavior change, A - Affirmed patient's thoughts, decisions, or attempts at behavior change and R - Reflected patient's change talk      ASSESSMENT: Current Emotional / Mental Status (status of significant symptoms):   Risk status (Self / Other harm or suicidal ideation)   Patient denies current fears or concerns for personal safety.   Patient denies current or recent suicidal ideation or behaviors.   Patient denies current or recent homicidal ideation or behaviors.   Patient denies current or recent self injurious behavior or ideation.   Patient denies other safety concerns.   Patient reports there has been no change in risk factors since their last session.     Patient reports there has been no change in protective factors since their last session.     Recommended that patient call 911 or go to the local ED should there be a change in any of these risk factors.     Appearance:   Appropriate    Eye Contact:   Fair    Psychomotor Behavior: Normal    Attitude:   Cooperative    Orientation:   All   Speech    Rate / Production: Normal     Volume:  Normal    Mood:    Normal   Affect:    Flat    Thought Content:  Clear    Thought Form:  Coherent  Logical    Insight:    Fair      Medication Review:   No changes to current psychiatric medication(s)     Medication Compliance:   Yes     Changes in Health Issues:   None reported     Chemical Use Review:   Substance Use: Chemical use reviewed, no active concerns identified      Tobacco Use: No current tobacco use.      Diagnosis:  MDD, recurrent, moderate    Collateral Reports Completed:   Not Applicable    PLAN: (Patient Tasks / Therapist Tasks / Other)  PT will engage in using CBT log at least 2x weekly for processing        Christopher Yates, SCOTT  5/16/22                                                          ______________________________________________________________________    Individual Treatment Plan    Patient's Name: Carl Mccurdy  YOB: 2000    Date of Creation: 5/16/22  Date Treatment Plan Last Reviewed/Revised: 8/16/22    DSM5 Diagnoses: 296.32 (F33.1) Major Depressive Disorder, Recurrent Episode, Moderate _  Psychosocial / Contextual Factors: 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    Anticipated number of session for this episode of care:   Anticipation frequency of session: Monthly  Anticipated Duration of each session: 38-52 minutes  Treatment plan will be reviewed in 90 days or when goals have been changed.       MeasurableTreatment Goal(s) related to diagnosis / functional impairment(s)  Goal 1: Patient will engage in CBT based processing tools to help with working through past events    Objective #A (Patient Action)    Patient will Identify negative self-talk and behaviors: challenge core beliefs, myths, and actions.  Status: New - Date: 5/16/22     Intervention(s)  Therapist will teach emotional regulation skills. CBT.    Patient has reviewed and agreed to the above plan.      Christopher Yates, SCOTT  May 16, 2022

## 2022-06-09 ENCOUNTER — VIRTUAL VISIT (OUTPATIENT)
Dept: PSYCHOLOGY | Facility: CLINIC | Age: 22
End: 2022-06-09
Payer: COMMERCIAL

## 2022-06-09 DIAGNOSIS — F33.1 MODERATE RECURRENT MAJOR DEPRESSION (H): Primary | ICD-10-CM

## 2022-06-09 PROCEDURE — 90834 PSYTX W PT 45 MINUTES: CPT | Mod: GT | Performed by: MARRIAGE & FAMILY THERAPIST

## 2022-06-09 NOTE — PROGRESS NOTES
[facility]                                     Progress Note    Patient Name: Carl Mccurdy  Date: 22         Service Type: Individual      Session Start Time: 8:00a  Session End Time: 8:39a     Session Length: 39    Session #: 2    Attendees: Client attended alone    Service Modality:  Video Visit:      Provider verified identity through the following two step process.  Patient provided:  Patient     Telemedicine Visit: The patient's condition can be safely assessed and treated via synchronous audio and visual telemedicine encounter.      Reason for Telemedicine Visit: Services only offered telehealth    Originating Site (Patient Location): Patient's home    Distant Site (Provider Location): Provider Remote Setting- Home Office    Consent:  The patient/guardian has verbally consented to: the potential risks and benefits of telemedicine (video visit) versus in person care; bill my insurance or make self-payment for services provided; and responsibility for payment of non-covered services.     Patient would like the video invitation sent by:  Send to e-mail at: [email or web address]    Mode of Communication:  Video Conference via Amwell    As the provider I attest to compliance with applicable laws and regulations related to telemedicine.    DATA  Interactive Complexity: No  Crisis: No        Progress Since Last Session (Related to Symptoms / Goals / Homework):   Symptoms: Improving Mood at 7/10    Homework: Completed in session      Episode of Care Goals: Satisfactory progress - ACTION (Actively working towards change); Intervened by reinforcing change plan / affirming steps taken     Current / Ongoing Stressors and Concerns:   Last session , pt reports things at work have been going very well and he is enjoying the job. Mood improved to a 7/10, pt has been staying busy. Pt continues to be sober and only had 1 instance where he thought further about using again due to  work stress. Pt notes not having any major stressors at this time and did not have any periods of past ruminations. PT does not endorse any depressive periods over the past week and minimal anxiety.      Treatment Objective(s) Addressed in This Session:   Increase interest, engagement, and pleasure in doing things       Intervention:   Motivational Interviewing    MI Intervention: Permission to raise concern or advise, Open-ended questions and Reflections: simple and complex     Change Talk Expressed by the Patient: Reasons to change Need to change Committment to change    Provider Response to Change Talk: E - Evoked more info from patient about behavior change and A - Affirmed patient's thoughts, decisions, or attempts at behavior change      ASSESSMENT: Current Emotional / Mental Status (status of significant symptoms):   Risk status (Self / Other harm or suicidal ideation)   Patient denies current fears or concerns for personal safety.   Patient denies current or recent suicidal ideation or behaviors.   Patient denies current or recent homicidal ideation or behaviors.   Patient denies current or recent self injurious behavior or ideation.   Patient denies other safety concerns.   Patient reports there has been no change in risk factors since their last session.     Patient reports there has been no change in protective factors since their last session.     Recommended that patient call 911 or go to the local ED should there be a change in any of these risk factors.     Appearance:   Appropriate    Eye Contact:   Good    Psychomotor Behavior: Normal    Attitude:   Cooperative    Orientation:   All   Speech    Rate / Production: Normal     Volume:  Normal    Mood:    Normal   Affect:    Appropriate    Thought Content:  Clear    Thought Form:  Coherent  Logical    Insight:    Good      Medication Review:   No changes to current psychiatric medication(s)     Medication Compliance:   Yes     Changes in Health  Issues:   None reported     Chemical Use Review:   Substance Use: Chemical use reviewed, no active concerns identified      Tobacco Use: No current tobacco use.      Diagnosis:  MDD, moderate, recurrent    Collateral Reports Completed:   Not Applicable    PLAN: (Patient Tasks / Therapist Tasks / Other)  PT will engage in 1x daily healthy coping skills        SCOTT Sanchez   6/9/22                                                       ______________________________________________________________________  Individual Treatment Plan     Patient's Name: Carl Mccurdy                    YOB: 2000     Date of Creation: 5/16/22  Date Treatment Plan Last Reviewed/Revised: 8/16/22     DSM5 Diagnoses: 296.32 (F33.1) Major Depressive Disorder, Recurrent Episode, Moderate _  Psychosocial / Contextual Factors: 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     Anticipated number of session for this episode of care:   Anticipation frequency of session: Monthly  Anticipated Duration of each session: 38-52 minutes  Treatment plan will be reviewed in 90 days or when goals have been changed.         MeasurableTreatment Goal(s) related to diagnosis / functional impairment(s)  Goal 1: Patient will engage in CBT based processing tools to help with working through past events     Objective #A (Patient Action)                          Patient will Identify negative self-talk and behaviors: challenge core beliefs, myths, and actions.  Status: New - Date: 5/16/22      Intervention(s)  Therapist will teach emotional regulation skills. CBT.     Patient has reviewed and agreed to the above plan.        SCOTT Sanchez                       May 16, 2022

## 2022-07-13 ENCOUNTER — VIRTUAL VISIT (OUTPATIENT)
Dept: PSYCHOLOGY | Facility: CLINIC | Age: 22
End: 2022-07-13
Payer: COMMERCIAL

## 2022-07-13 DIAGNOSIS — F33.1 MODERATE RECURRENT MAJOR DEPRESSION (H): Primary | ICD-10-CM

## 2022-07-13 PROCEDURE — 90834 PSYTX W PT 45 MINUTES: CPT | Mod: GT | Performed by: MARRIAGE & FAMILY THERAPIST

## 2022-07-13 ASSESSMENT — COLUMBIA-SUICIDE SEVERITY RATING SCALE - C-SSRS
SUICIDE, SINCE LAST CONTACT: NO
6. HAVE YOU EVER DONE ANYTHING, STARTED TO DO ANYTHING, OR PREPARED TO DO ANYTHING TO END YOUR LIFE?: NO
1. SINCE LAST CONTACT, HAVE YOU WISHED YOU WERE DEAD OR WISHED YOU COULD GO TO SLEEP AND NOT WAKE UP?: NO
TOTAL  NUMBER OF ABORTED OR SELF INTERRUPTED ATTEMPTS SINCE LAST CONTACT: NO
ATTEMPT SINCE LAST CONTACT: NO
TOTAL  NUMBER OF INTERRUPTED ATTEMPTS SINCE LAST CONTACT: NO
2. HAVE YOU ACTUALLY HAD ANY THOUGHTS OF KILLING YOURSELF?: NO

## 2022-07-13 ASSESSMENT — PATIENT HEALTH QUESTIONNAIRE - PHQ9
10. IF YOU CHECKED OFF ANY PROBLEMS, HOW DIFFICULT HAVE THESE PROBLEMS MADE IT FOR YOU TO DO YOUR WORK, TAKE CARE OF THINGS AT HOME, OR GET ALONG WITH OTHER PEOPLE: NOT DIFFICULT AT ALL
SUM OF ALL RESPONSES TO PHQ QUESTIONS 1-9: 3
SUM OF ALL RESPONSES TO PHQ QUESTIONS 1-9: 3

## 2022-07-13 NOTE — PROGRESS NOTES
Answers for HPI/ROS submitted by the patient on 2022  If you checked off any problems, how difficult have these problems made it for you to do your work, take care of things at home, or get along with other people?: Not difficult at all  PHQ9 TOTAL SCORE: 3        [facility]                                     Progress Note    Patient Name: Carl Mccurdy  Date: 22         Service Type: Individual      Session Start Time: 10:01a  Session End Time: 10:39a     Session Length: 38    Session #: 3    Attendees: Client attended alone    Service Modality:  Video Visit:      Provider verified identity through the following two step process.  Patient provided:  Patient     Telemedicine Visit: The patient's condition can be safely assessed and treated via synchronous audio and visual telemedicine encounter.      Reason for Telemedicine Visit: Services only offered telehealth    Originating Site (Patient Location): Patient's home    Distant Site (Provider Location): Provider Remote Setting- Home Office    Consent:  The patient/guardian has verbally consented to: the potential risks and benefits of telemedicine (video visit) versus in person care; bill my insurance or make self-payment for services provided; and responsibility for payment of non-covered services.     Patient would like the video invitation sent by:  Send to e-mail at: [email or web address]    Mode of Communication:  Video Conference via [employer]well    As the provider I attest to compliance with applicable laws and regulations related to telemedicine.    DATA  Interactive Complexity: No  Crisis: No        Progress Since Last Session (Related to Symptoms / Goals / Homework):   Symptoms: Worsening Mood at 5/10    Homework: Partially completed      Episode of Care Goals: Satisfactory progress - ACTION (Actively working towards change); Intervened by reinforcing change plan / affirming steps taken     Current / Ongoing  Stressors and Concerns:   Last session 6/9, pt reports that the past 5 weeks have been somewhat stressful and he is planning on moving back to [place] in August with his girlfriend to be closer to her family. Work has been stable overall with no issues. Mood decreased slightly with some more periods of depression after deciding to move since [place] has been his home. Pt reports not having any times where he thought about using substances.                Treatment Objective(s) Addressed in This Session:   Identify negative self-talk and behaviors: challenge core beliefs, myths, and actions       Intervention:   Motivational Interviewing    MI Intervention: Expressed Empathy/Understanding, Supported Autonomy, Collaboration, Evocation and Permission to raise concern or advise     Change Talk Expressed by the Patient: Reasons to change Need to change Committment to change    Provider Response to Change Talk: E - Evoked more info from patient about behavior change, A - Affirmed patient's thoughts, decisions, or attempts at behavior change and R - Reflected patient's change talk      ASSESSMENT: Current Emotional / Mental Status (status of significant symptoms):   Risk status (Self / Other harm or suicidal ideation)   Patient denies current fears or concerns for personal safety.   Patient denies current or recent suicidal ideation or behaviors.   Patient denies current or recent homicidal ideation or behaviors.   Patient denies current or recent self injurious behavior or ideation.   Patient denies other safety concerns.   Patient reports there has been no change in risk factors since their last session.     Patient reports there has been no change in protective factors since their last session.     Recommended that patient call 911 or go to the local ED should there be a change in any of these risk factors.     Appearance:   Appropriate    Eye Contact:   Good    Psychomotor Behavior: Normal    Attitude:   Cooperative     Orientation:   All   Speech    Rate / Production: Normal     Volume:  Normal    Mood:    Normal   Affect:    Appropriate    Thought Content:  Clear    Thought Form:  Coherent  Logical    Insight:    Good      Medication Review:   No changes to current psychiatric medication(s)     Medication Compliance:   Yes     Changes in Health Issues:   None reported     Chemical Use Review:   Substance Use: Chemical use reviewed, no active concerns identified      Tobacco Use: No current tobacco use.      Diagnosis:  MDD, moderate, recurrent    Collateral Reports Completed:   Not Applicable    PLAN: (Patient Tasks / Therapist Tasks / Other)  PT will discharge from [facility] services due to moving to [place] in August.        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     Anticipated number of session for this episode of care:   Anticipation frequency of session: Monthly  Anticipated Duration of each session: 38-52 minutes  Treatment plan will be reviewed in 90 days or when goals have been changed.         MeasurableTreatment Goal(s) related to diagnosis / functional impairment(s)  Goal 1: Patient will engage in CBT based processing tools to help with working through past events     Objective #A (Patient Action)                          Patient will Identify negative self-talk and behaviors: challenge  core beliefs, myths, and actions.  Status: New - Date: 22      Intervention(s)  Therapist will teach emotional regulation skills. CBT.     Patient has reviewed and agreed to the above plan.        SCOTT Sanchez                       May 16, 2022                           Discharge Summary  Single Session    Client Name: Carl Mccurdy MRN#: [id number] YOB: 2000    Discharge Date:   2022    Service Modality: Video Visit:      Provider verified identity through the following two step process.  Patient provided:  Patient     Telemedicine Visit: The patient's condition can be safely assessed and treated via synchronous audio and visual telemedicine encounter.      Reason for Telemedicine Visit: Services only offered telehealth    Originating Site (Patient Location): Patient's home    Distant Site (Provider Location): Provider Remote Setting- Home Office    Consent:  The patient/guardian has verbally consented to: the potential risks and benefits of telemedicine (video visit) versus in person care; bill my insurance or make self-payment for services provided; and responsibility for payment of non-covered services.     Patient would like the video invitation sent by:  Send to e-mail at: [email or web address]    Mode of Communication:  Video Conference via well    As the provider I attest to compliance with applicable laws and regulations related to telemedicine.    Service Type: Individual      Session Start Time: 10:01a  Session End Time: 10:39a      Session Length: 45 - 50     Session #: 3     Attendees: Client attended alone      Focus of Treatment Objective(s):  Client's presenting concerns included: Depressed Mood - low mood, low motivation  Stage of Change at time of Discharge: MAINTENANCE (Working to maintain change, with risk of relapse)    Medication Adherence:  No    Chemical Use:  No    Assessment: Current Emotional / Mental Status (status of  significant symptoms):    Risk status (Self / Other harm or suicidal ideation)  Client denies current fears or concerns for personal safety.  Client denies current or recent suicidal ideation or behaviors.  Client denies current or recent homicidal ideation or behaviors.  Client denies current or recent self injurious behavior or ideation.  Client denies other safety concerns.  A safety and risk management plan has not been developed at this time, however client was given the after-hours number should there be a change in any of these risk factors.    Appearance:   Appropriate   Eye Contact:   Good   Psychomotor Behavior: Normal   Attitude:   Cooperative   Orientation:   All  Speech   Rate / Production: Normal    Volume:  Normal   Mood:    Normal  Affect:    Appropriate   Thought Content:  Clear   Thought Form:  Coherent  Logical   Insight:   Good     DSM5 Diagnoses: (Sustained by DSM5 Criteria Listed Above)  Diagnoses: 296.32 (F33.1) Major Depressive Disorder, Recurrent Episode, Moderate _  Psychosocial & Contextual Factors: PT working full time  WHODAS 2.0 (12 item) Score:     Reason for Discharge:  Goals completed and Pt will be moving to [place] in mid-August      Aftercare Plan:  Client may resume counseling services at any time in the future by calling the [facility] Intake Office, [phone].      Christopher Yates, SCOTT  July 13, 2022

## 2022-07-17 ENCOUNTER — HEALTH MAINTENANCE LETTER (OUTPATIENT)
Age: 22
End: 2022-07-17

## 2022-09-25 ENCOUNTER — HEALTH MAINTENANCE LETTER (OUTPATIENT)
Age: 22
End: 2022-09-25

## 2023-08-05 ENCOUNTER — HEALTH MAINTENANCE LETTER (OUTPATIENT)
Age: 23
End: 2023-08-05

## 2023-12-01 ENCOUNTER — E-VISIT (OUTPATIENT)
Dept: URGENT CARE | Facility: CLINIC | Age: 23
End: 2023-12-01
Payer: COMMERCIAL

## 2023-12-01 DIAGNOSIS — J06.9 UPPER RESPIRATORY TRACT INFECTION, UNSPECIFIED TYPE: Primary | ICD-10-CM

## 2023-12-01 PROCEDURE — 99421 OL DIG E/M SVC 5-10 MIN: CPT | Performed by: PHYSICIAN ASSISTANT

## 2023-12-01 NOTE — PATIENT INSTRUCTIONS
Dear ,    After reviewing your responses, I would like you to come in for a swab to make sure we treat you correctly. This swab is to evaluate you for possible COVID, Flu, and Strep Throat, and should be scheduled for today or tomorrow. Please use the Schedule Now button in [employer] to schedule your swab. Otherwise, click this link to schedule a lab only appointment.    Lab appointments are not available at most locations on the weekends. If no Lab Only appointment is available, you should be seen in any of our convenient Urgent Care Centers for an in person visit, which can be found on our website here.    You will receive instructions with your results in [employer] once they are available.     If your symptoms worsen, you develop difficulty breathing, difficulty with drinking enough to stay hydrated, or fevers for more than 5 days, please contact your primary care provider for an appointment or visit an Urgent Care Center to be seen.      Thanks again for choosing us as your health care partner.   Aixa Braswell PA-C  Viral Respiratory Infection: Care Instructions  Overview     A viral respiratory infection is an infection of the nose, sinuses, or throat caused by a virus. Colds and the flu are common types of viral respiratory infections.  The symptoms of a viral respiratory infection often start quickly. They include a fever, sore throat, and runny nose. You may also just not feel well. Or you may not want to eat much.  Most viral infections can be treated with home care. This may include drinking lots of fluids and taking over-the-counter pain medicine. You will probably feel better in 4 to 10 days.  Antibiotics are not used to treat a viral infection. Antibiotics don't kill viruses, so they won't help cure a viral illness.  In some cases, a doctor may prescribe antiviral medicine to help your body fight a serious viral infection.  Follow-up care is a key part of your treatment and safety. Be sure to make and  go to all appointments, and call your doctor if you are having problems. It's also a good idea to know your test results and keep a list of the medicines you take.  How can you care for yourself at home?  To prevent dehydration, drink plenty of fluids. Choose water and other clear liquids until you feel better. If you have kidney, heart, or liver disease and have to limit fluids, talk with your doctor before you increase the amount of fluids you drink.  Ask your doctor if you can take an over-the-counter pain medicine, such as acetaminophen (Tylenol), ibuprofen (Advil, Motrin), or naproxen (Aleve). Be safe with medicines. Read and follow all instructions on the label. No one younger than 20 should take aspirin. It has been linked to Reye syndrome, a serious illness.  Be careful when taking over-the-counter cold or flu medicines and Tylenol at the same time. Many of these medicines have acetaminophen, which is Tylenol. Read the labels to make sure that you are not taking more than the recommended dose. Too much acetaminophen (Tylenol) can be harmful.  Get plenty of rest.  Use saline (saltwater) nasal washes to help keep your nasal passages open and wash out mucus and allergens. You can buy saline nose sprays at a grocery store or drugstore. Follow the instructions on the package. Or you can make your own at home. Add 1 teaspoon of non-iodized salt and 1 teaspoon of baking soda to 2 cups of distilled or boiled and cooled water. Fill a squeeze bottle or neti pot with the nasal wash. Then put the tip into your nostril, and lean over the sink. With your mouth open, gently squirt the liquid. Repeat on the other side.  Use a vaporizer or humidifier to add moisture to your bedroom. Follow the instructions for cleaning the machine.  Do not smoke or allow others to smoke around you. If you need help quitting, talk to your doctor about stop-smoking programs and medicines. These can increase your chances of quitting for  "good.  When should you call for help?   Call 911 anytime you think you may need emergency care. For example, call if:    You have severe trouble breathing.   Call your doctor now or seek immediate medical care if:    You have a new or higher fever.     Your fever lasts more than 48 hours.     You have trouble breathing.     You have a fever with a stiff neck or a severe headache.     You are sensitive to light.     You feel very sleepy or confused.   Watch closely for changes in your health, and be sure to contact your doctor if:    You do not get better as expected.   Where can you learn more?  Go to https://www.[employer].net/patiented  Enter Q795 in the search box to learn more about \"Viral Respiratory Infection: Care Instructions.\"  Current as of: June 13, 2023               Content Version: 13.8    [id number] [employer].   Care instructions adapted under license by your healthcare professional. If you have questions about a medical condition or this instruction, always ask your healthcare professional. [employer] disclaims any warranty or liability for your use of this information.      ,    Thank you for choosing us for your care. I have placed an order for a lab test(s). View your full visit summary for details by clicking on the link below. You can schedule a lab only appointment right here in [place], or by calling [phone].    You will receive your lab results and next steps via [employer] when the lab results return.    Sincerely,  Aixa Braswell PA-C     "

## 2024-09-28 ENCOUNTER — HEALTH MAINTENANCE LETTER (OUTPATIENT)
Age: 24
End: 2024-09-28

## 2024-11-16 ENCOUNTER — OFFICE VISIT (OUTPATIENT)
Dept: URGENT CARE | Facility: URGENT CARE | Age: 24
End: 2024-11-16

## 2024-11-16 VITALS
BODY MASS INDEX: 27.26 KG/M2 | HEART RATE: 58 BPM | DIASTOLIC BLOOD PRESSURE: 80 MMHG | WEIGHT: 201.25 LBS | HEIGHT: 72 IN | TEMPERATURE: 97.7 F | OXYGEN SATURATION: 100 % | SYSTOLIC BLOOD PRESSURE: 131 MMHG

## 2024-11-16 DIAGNOSIS — R07.0 THROAT PAIN: Primary | ICD-10-CM

## 2024-11-16 LAB
DEPRECATED S PYO AG THROAT QL EIA: NEGATIVE
GROUP A STREP BY PCR: NOT DETECTED

## 2024-11-16 PROCEDURE — 87651 STREP A DNA AMP PROBE: CPT | Performed by: EMERGENCY MEDICINE

## 2024-11-16 PROCEDURE — 99213 OFFICE O/P EST LOW 20 MIN: CPT | Performed by: EMERGENCY MEDICINE

## 2024-11-16 NOTE — PROGRESS NOTES
CHIEF COMPLAINT: URI symptoms      HPI: Patient is a 24-year-old male who has been ill for about 2 days with cough, nasal congestion and sore throat.  No COVID testing to date.  No shortness of breath.  No history of asthma or any lung disease.  Ears feel somewhat plugged.      ROS: See HPI otherwise normal    No Known Allergies   Current Outpatient Medications   Medication Sig Dispense Refill    sertraline (ZOLOFT) 100 MG tablet Take 1 tablet (100 mg) by mouth daily (Patient not taking: Reported on 11/16/2024) 90 tablet 3         PE: No acute distress.  Afebrile.  HEENT reveals moist oral mucous membranes.  Posterior pharynx is very slightly erythematous without exudate or abscess.  Ears reveal changes of serous otitis but no acute infection.  Lungs are clear throughout.  Heart is regular.        TREATMENT: Rapid strep: Negative      ASSESSMENT: Viral URI stable for outpatient management      DIAGNOSIS: Viral URI.        PLAN:DayQuil/NyQuil type medications.  Recheck 5 to 7 days if still ill, sooner if more ill.

## 2024-11-16 NOTE — LETTER
November 16, 2024      Carl Mccurdy  [address] [place] [id number]        To Whom It May Concern:    Carl Mccurdy  was seen on 11/16/24.  Please excuse him  until 11/20/24, due to illness. May return sooner if better.        Sincerely,        Garo Garcia MD

## 2025-01-25 ENCOUNTER — OFFICE VISIT (OUTPATIENT)
Dept: URGENT CARE | Facility: URGENT CARE | Age: 25
End: 2025-01-25

## 2025-01-25 VITALS
OXYGEN SATURATION: 99 % | TEMPERATURE: 97.3 F | DIASTOLIC BLOOD PRESSURE: 80 MMHG | BODY MASS INDEX: 26.18 KG/M2 | WEIGHT: 193 LBS | RESPIRATION RATE: 16 BRPM | SYSTOLIC BLOOD PRESSURE: 129 MMHG | HEART RATE: 85 BPM

## 2025-01-25 DIAGNOSIS — R36.9 DISCHARGE FROM PENIS: ICD-10-CM

## 2025-01-25 DIAGNOSIS — R30.0 DYSURIA: Primary | ICD-10-CM

## 2025-01-25 LAB
ALBUMIN UR-MCNC: ABNORMAL MG/DL
APPEARANCE UR: CLEAR
BACTERIA #/AREA URNS HPF: ABNORMAL /HPF
BILIRUB UR QL STRIP: NEGATIVE
COLOR UR AUTO: YELLOW
GLUCOSE UR STRIP-MCNC: NEGATIVE MG/DL
HGB UR QL STRIP: ABNORMAL
HIV 1+2 AB+HIV1 P24 AG SERPL QL IA: NONREACTIVE
KETONES UR STRIP-MCNC: >=160 MG/DL
LEUKOCYTE ESTERASE UR QL STRIP: ABNORMAL
NITRATE UR QL: NEGATIVE
PH UR STRIP: 6 [PH] (ref 5–7)
RBC #/AREA URNS AUTO: ABNORMAL /HPF
SP GR UR STRIP: 1.02 (ref 1–1.03)
UROBILINOGEN UR STRIP-ACNC: 0.2 E.U./DL
WBC #/AREA URNS AUTO: ABNORMAL /HPF

## 2025-01-25 PROCEDURE — 87389 HIV-1 AG W/HIV-1&-2 AB AG IA: CPT | Performed by: NURSE PRACTITIONER

## 2025-01-25 PROCEDURE — 81001 URINALYSIS AUTO W/SCOPE: CPT | Performed by: NURSE PRACTITIONER

## 2025-01-25 PROCEDURE — 87563 M. GENITALIUM AMP PROBE: CPT | Mod: 90 | Performed by: NURSE PRACTITIONER

## 2025-01-25 PROCEDURE — 86592 SYPHILIS TEST NON-TREP QUAL: CPT | Performed by: NURSE PRACTITIONER

## 2025-01-25 PROCEDURE — 87086 URINE CULTURE/COLONY COUNT: CPT | Performed by: NURSE PRACTITIONER

## 2025-01-25 PROCEDURE — 86780 TREPONEMA PALLIDUM: CPT | Performed by: NURSE PRACTITIONER

## 2025-01-25 PROCEDURE — 99000 SPECIMEN HANDLING OFFICE-LAB: CPT | Performed by: NURSE PRACTITIONER

## 2025-01-25 PROCEDURE — 87591 N.GONORRHOEAE DNA AMP PROB: CPT | Performed by: NURSE PRACTITIONER

## 2025-01-25 PROCEDURE — 36415 COLL VENOUS BLD VENIPUNCTURE: CPT | Performed by: NURSE PRACTITIONER

## 2025-01-25 PROCEDURE — 99213 OFFICE O/P EST LOW 20 MIN: CPT | Performed by: NURSE PRACTITIONER

## 2025-01-25 PROCEDURE — 87798 DETECT AGENT NOS DNA AMP: CPT | Mod: 90 | Performed by: NURSE PRACTITIONER

## 2025-01-25 PROCEDURE — 87491 CHLMYD TRACH DNA AMP PROBE: CPT | Performed by: NURSE PRACTITIONER

## 2025-01-25 PROCEDURE — 86780 TREPONEMA PALLIDUM: CPT | Mod: 90 | Performed by: NURSE PRACTITIONER

## 2025-01-25 RX ORDER — ACETAMINOPHEN 500 MG
500-1000 TABLET ORAL EVERY 6 HOURS PRN
COMMUNITY

## 2025-01-25 ASSESSMENT — PAIN SCALES - GENERAL: PAINLEVEL_OUTOF10: MODERATE PAIN (5)

## 2025-01-25 NOTE — PATIENT INSTRUCTIONS
Recommend repeat testing in 3 weeks if symptoms persist.  We will call you if any of the tests come back positive.  Safer Sex: Care Instructions  Overview  Safer sex is a way to reduce your risk of getting a sexually transmitted infection (STI). It can also help prevent pregnancy.  Several products can help you practice safer sex and reduce your chance of STIs. One of the best is a condom. There are internal and external condoms. You can use a special rubber sheet (dental dam) for protection during oral sex. Disposable gloves can keep your hands from touching blood, semen, or other body fluids that can carry infections.  Remember that birth control methods such as diaphragms, IUDs, foams, and birth control pills do not stop you from getting STIs.  Follow-up care is a key part of your treatment and safety. Be sure to make and go to all appointments, and call your doctor if you are having problems. It's also a good idea to know your test results and keep a list of the medicines you take.  How can you care for yourself at home?  Think about getting vaccinated to help prevent hepatitis A, hepatitis B, and human papillomavirus (HPV). They can be spread through sex.  Use a condom every time you have sex. Use an external condom, which goes on the penis. Or use an internal condom, which goes into the vagina or anus.  Make sure you use the right size external condom. A condom that's too small can break easily. A condom that's too big can slip off during sex.  Use a new condom each time you have sex. Be careful not to poke a hole in the condom when you open the wrapper.  Don't use an internal condom and an external condom at the same time.  Never use petroleum jelly (such as Vaseline), grease, hand lotion, baby oil, or anything with oil in it. These products can make holes in the condom.  After intercourse, hold the edge of the condom as you remove it. This will help keep semen from spilling out of the condom.  Do not have  "sex with anyone who has symptoms of an STI, such as sores on the genitals or mouth.  Do not drink a lot of alcohol or use drugs before sex.  Limit your sex partners. Sex with one partner who has sex only with you can reduce your risk of getting an STI.  Don't share sex toys. But if you do share them, use a condom and clean the sex toys between each use.  Talk to any partners before you have sex. Talk about what you feel comfortable with and whether you have any boundaries with sex. And find out if your partner or partners may be at risk for any STI. Keep in mind that a person may be able to spread an STI even if they do not have symptoms. You and any partners may want to get tested for STIs.  Where can you learn more?  Go to https://www.path intelligence.net/patiented  Enter B608 in the search box to learn more about \"Safer Sex: Care Instructions.\"  Current as of: April 30, 2024  Content Version: 14.3    2024 [employer].   Care instructions adapted under license by your healthcare professional. If you have questions about a medical condition or this instruction, always ask your healthcare professional. [employer] disclaims any warranty or liability for your use of this information.    "

## 2025-01-25 NOTE — PROGRESS NOTES
SUBJECTIVE:   Carl Mccurdy is a 24 year old male presenting with a chief complaint of   Chief Complaint   Patient presents with    Penis/Scrotum Problem     Redness round the tip of penis, noticed yesterday with painful urination. Might have noticed some discharge. Will like testing for STD.       Past Medical History:   Diagnosis Date    Tetrahydrocannabinol (THC) use disorder, moderate, dependence (H) 8/30/2018     Family History   Problem Relation Age of Onset    Depression Mother     Anxiety Disorder Mother     Depression Father     Anxiety Disorder Father     Depression Maternal Grandmother     Anxiety Disorder Maternal Grandmother     Depression Maternal Grandfather     Anxiety Disorder Maternal Grandfather     Depression Paternal Grandmother     Anxiety Disorder Paternal Grandmother     Depression Paternal Grandfather     Anxiety Disorder Paternal Grandfather      Current Outpatient Medications   Medication Sig Dispense Refill    acetaminophen (TYLENOL) 500 MG tablet Take 500-1,000 mg by mouth every 6 hours as needed for mild pain.      sertraline (ZOLOFT) 100 MG tablet Take 1 tablet (100 mg) by mouth daily (Patient not taking: Reported on 1/25/2025) 90 tablet 3     Social History     Tobacco Use    Smoking status: Former     Types: Cigarettes     Passive exposure: Current    Smokeless tobacco: Current    Tobacco comments:     e-cigarette use   Substance Use Topics    Alcohol use: Not on file       OBJECTIVE  /80 (BP Location: Right arm, Patient Position: Sitting, Cuff Size: Adult Regular)   Pulse 85   Temp 97.3  F (36.3  C) (Tympanic)   Resp 16   Wt 87.5 kg (193 lb)   SpO2 99%   BMI 26.18 kg/m      Physical Exam  Constitutional:       Appearance: Normal appearance.   Pulmonary:      Effort: Pulmonary effort is normal.   Skin:     General: Skin is warm and dry.   Neurological:      Mental Status: He is alert.   Psychiatric:         Mood and Affect: Mood normal.       Recent Results (from  the past week)   UA Macroscopic with reflex to Microscopic and Culture    Specimen: Urine, Midstream   Result Value Ref Range Status    Color Urine Yellow Colorless, Straw, Light Yellow, Yellow Final    Appearance Urine Clear Clear Final    Glucose Urine Negative Negative mg/dL Final    Bilirubin Urine Negative Negative Final    Ketones Urine >=160 (A) Negative mg/dL Final    Specific Gravity Urine 1.020 1.003 - 1.035 Final    Blood Urine Trace (A) Negative Final    pH Urine 6.0 5.0 - 7.0 Final    Protein Albumin Urine Trace (A) Negative mg/dL Final    Urobilinogen Urine 0.2 0.2, 1.0 E.U./dL Final    Nitrite Urine Negative Negative Final    Leukocyte Esterase Urine Small (A) Negative Final   UA Microscopic with Reflex to Culture   Result Value Ref Range Status    Bacteria Urine Few (A) None Seen /HPF Final    RBC Urine 0-2 0-2 /HPF /HPF Final    WBC Urine 5-10 (A) 0-5 /HPF /HPF Final     *Note: Due to a large number of results and/or encounters for the requested time period, some results have not been displayed. A complete set of results can be found in Results Review.     HIV: pending  Syphilis: pending  Urine Culture: pending  Chlamydia/Gonorrhea: pending    ASSESSMENT:  1. Dysuria (Primary)    - UA Macroscopic with reflex to Microscopic and Culture  - UA Microscopic with Reflex to Culture  - Urogenital Ureaplasma and Mycoplasma Species by PCR; Future  - Urogenital Ureaplasma and Mycoplasma Species by PCR  - Urine Culture Aerobic Bacterial; Future    2. Discharge from penis    - Chlamydia trachomatis/Neisseria gonorrhoeae by PCR - Clinic Collect  - HIV Antigen Antibody Combo Cascade; Future  - Treponema Abs w Reflex to RPR and Titer; Future  - Urogenital Ureaplasma and Mycoplasma Species by PCR; Future  - Urogenital Ureaplasma and Mycoplasma Species by PCR  - Treponema Abs w Reflex to RPR and Titer  - HIV Antigen Antibody Combo Cascade  - Urine Culture Aerobic Bacterial; Future        PLAN:  Recommend repeat  testing in 3 weeks if symptoms persist.  We will call you if any of the tests come back positive.    Safer Sex: Care Instructions  Overview  Safer sex is a way to reduce your risk of getting a sexually transmitted infection (STI). It can also help prevent pregnancy.  Several products can help you practice safer sex and reduce your chance of STIs. One of the best is a condom. There are internal and external condoms. You can use a special rubber sheet (dental dam) for protection during oral sex. Disposable gloves can keep your hands from touching blood, semen, or other body fluids that can carry infections.  Remember that birth control methods such as diaphragms, IUDs, foams, and birth control pills do not stop you from getting STIs.  Follow-up care is a key part of your treatment and safety. Be sure to make and go to all appointments, and call your doctor if you are having problems. It's also a good idea to know your test results and keep a list of the medicines you take.  How can you care for yourself at home?  Think about getting vaccinated to help prevent hepatitis A, hepatitis B, and human papillomavirus (HPV). They can be spread through sex.  Use a condom every time you have sex. Use an external condom, which goes on the penis. Or use an internal condom, which goes into the vagina or anus.  Make sure you use the right size external condom. A condom that's too small can break easily. A condom that's too big can slip off during sex.  Use a new condom each time you have sex. Be careful not to poke a hole in the condom when you open the wrapper.  Don't use an internal condom and an external condom at the same time.  Never use petroleum jelly (such as Vaseline), grease, hand lotion, baby oil, or anything with oil in it. These products can make holes in the condom.  After intercourse, hold the edge of the condom as you remove it. This will help keep semen from spilling out of the condom.  Do not have sex with anyone  "who has symptoms of an STI, such as sores on the genitals or mouth.  Do not drink a lot of alcohol or use drugs before sex.  Limit your sex partners. Sex with one partner who has sex only with you can reduce your risk of getting an STI.  Don't share sex toys. But if you do share them, use a condom and clean the sex toys between each use.  Talk to any partners before you have sex. Talk about what you feel comfortable with and whether you have any boundaries with sex. And find out if your partner or partners may be at risk for any STI. Keep in mind that a person may be able to spread an STI even if they do not have symptoms. You and any partners may want to get tested for STIs.  Where can you learn more?  Go to https://www.[employer]/patiented  Enter B608 in the search box to learn more about \"Safer Sex: Care Instructions.\"  Current as of: April 30, 2024  Content Version: 14.3    2024 [employer].   Care instructions adapted under license by your healthcare professional. If you have questions about a medical condition or this instruction, always ask your healthcare professional. [employer] disclaims any warranty or liability for your use of this information.        "

## 2025-01-26 LAB
BACTERIA UR CULT: NO GROWTH
C TRACH DNA SPEC QL PROBE+SIG AMP: NEGATIVE
N GONORRHOEA DNA SPEC QL NAA+PROBE: NEGATIVE
SPECIMEN TYPE: NORMAL
T PALLIDUM AB SER QL: REACTIVE

## 2025-01-27 LAB — RPR SER QL: NONREACTIVE

## 2025-01-28 LAB
M GENITALIUM DNA SPEC QL NAA+PROBE: NOT DETECTED
M HOMINIS DNA SPEC QL NAA+PROBE: NOT DETECTED
U PARVUM DNA SPEC QL NAA+PROBE: NOT DETECTED
U UREALYTICUM DNA SPEC QL NAA+PROBE: NOT DETECTED

## 2025-01-29 LAB — T PALLIDUM AB SER QL AGGL: NON REACTIVE

## 2025-01-29 NOTE — RESULT ENCOUNTER NOTE
, the genital culture shows no infection. I believe you are still waiting on a confirmation test for the syphilis antibody and will be notified when those results are done.   Karoline Ba MD